# Patient Record
Sex: FEMALE | Race: OTHER | HISPANIC OR LATINO
[De-identification: names, ages, dates, MRNs, and addresses within clinical notes are randomized per-mention and may not be internally consistent; named-entity substitution may affect disease eponyms.]

---

## 2017-03-28 ENCOUNTER — APPOINTMENT (OUTPATIENT)
Dept: PLASTIC SURGERY | Facility: CLINIC | Age: 52
End: 2017-03-28

## 2017-03-28 VITALS
SYSTOLIC BLOOD PRESSURE: 151 MMHG | HEART RATE: 59 BPM | HEIGHT: 60 IN | WEIGHT: 165 LBS | BODY MASS INDEX: 32.39 KG/M2 | TEMPERATURE: 97.7 F | DIASTOLIC BLOOD PRESSURE: 93 MMHG

## 2017-03-28 DIAGNOSIS — Z78.9 OTHER SPECIFIED HEALTH STATUS: ICD-10-CM

## 2017-03-28 DIAGNOSIS — N62 HYPERTROPHY OF BREAST: ICD-10-CM

## 2017-03-28 DIAGNOSIS — I10 ESSENTIAL (PRIMARY) HYPERTENSION: ICD-10-CM

## 2017-03-28 DIAGNOSIS — Z82.49 FAMILY HISTORY OF ISCHEMIC HEART DISEASE AND OTHER DISEASES OF THE CIRCULATORY SYSTEM: ICD-10-CM

## 2017-03-30 PROBLEM — Z78.9 SOCIAL ALCOHOL USE: Status: ACTIVE | Noted: 2017-03-28

## 2017-03-30 PROBLEM — Z78.9 EXERCISES DAILY: Status: ACTIVE | Noted: 2017-03-28

## 2017-03-30 PROBLEM — Z78.9 DOES NOT USE ILLICIT DRUGS: Status: ACTIVE | Noted: 2017-03-28

## 2017-03-30 PROBLEM — Z82.49 FAMILY HISTORY OF HYPERTENSION: Status: ACTIVE | Noted: 2017-03-28

## 2017-03-30 RX ORDER — AMOXICILLIN AND CLAVULANATE POTASSIUM 500; 125 MG/1; MG/1
500-125 TABLET, FILM COATED ORAL
Qty: 14 | Refills: 0 | Status: COMPLETED | COMMUNITY
Start: 2016-12-06

## 2017-03-30 RX ORDER — METHOCARBAMOL 500 MG/1
500 TABLET, FILM COATED ORAL
Qty: 40 | Refills: 0 | Status: COMPLETED | COMMUNITY
Start: 2016-11-08

## 2017-03-30 RX ORDER — METRONIDAZOLE 7.5 MG/G
0.75 GEL VAGINAL
Qty: 70 | Refills: 0 | Status: COMPLETED | COMMUNITY
Start: 2016-10-05

## 2017-03-30 RX ORDER — TRIAMTERENE AND HYDROCHLOROTHIAZIDE 25; 37.5 MG/1; MG/1
37.5-25 TABLET ORAL
Qty: 90 | Refills: 0 | Status: ACTIVE | COMMUNITY
Start: 2017-02-28

## 2017-04-03 PROBLEM — N62 MACROMASTIA: Status: ACTIVE | Noted: 2017-04-03

## 2017-04-03 PROBLEM — I10 HYPERTENSION: Status: ACTIVE | Noted: 2017-03-28

## 2019-05-04 ENCOUNTER — EMERGENCY (EMERGENCY)
Facility: HOSPITAL | Age: 54
LOS: 1 days | Discharge: HOME | End: 2019-05-04
Admitting: EMERGENCY MEDICINE
Payer: COMMERCIAL

## 2019-05-04 VITALS
HEART RATE: 81 BPM | SYSTOLIC BLOOD PRESSURE: 122 MMHG | RESPIRATION RATE: 16 BRPM | OXYGEN SATURATION: 100 % | DIASTOLIC BLOOD PRESSURE: 70 MMHG | TEMPERATURE: 99 F

## 2019-05-04 VITALS
DIASTOLIC BLOOD PRESSURE: 97 MMHG | HEART RATE: 90 BPM | TEMPERATURE: 101 F | RESPIRATION RATE: 16 BRPM | OXYGEN SATURATION: 100 % | SYSTOLIC BLOOD PRESSURE: 160 MMHG

## 2019-05-04 LAB
ALBUMIN SERPL ELPH-MCNC: 4.6 G/DL — SIGNIFICANT CHANGE UP (ref 3.3–5)
ALP SERPL-CCNC: 57 U/L — SIGNIFICANT CHANGE UP (ref 40–120)
ALT FLD-CCNC: 20 U/L — SIGNIFICANT CHANGE UP (ref 4–33)
ANION GAP SERPL CALC-SCNC: 12 MMO/L — SIGNIFICANT CHANGE UP (ref 7–14)
APPEARANCE UR: CLEAR — SIGNIFICANT CHANGE UP
AST SERPL-CCNC: 16 U/L — SIGNIFICANT CHANGE UP (ref 4–32)
BASE EXCESS BLDV CALC-SCNC: 3.6 MMOL/L — SIGNIFICANT CHANGE UP
BASOPHILS # BLD AUTO: 0.03 K/UL — SIGNIFICANT CHANGE UP (ref 0–0.2)
BASOPHILS NFR BLD AUTO: 0.7 % — SIGNIFICANT CHANGE UP (ref 0–2)
BILIRUB SERPL-MCNC: 0.3 MG/DL — SIGNIFICANT CHANGE UP (ref 0.2–1.2)
BILIRUB UR-MCNC: NEGATIVE — SIGNIFICANT CHANGE UP
BLOOD GAS VENOUS - CREATININE: 0.64 MG/DL — SIGNIFICANT CHANGE UP (ref 0.5–1.3)
BLOOD UR QL VISUAL: NEGATIVE — SIGNIFICANT CHANGE UP
BUN SERPL-MCNC: 10 MG/DL — SIGNIFICANT CHANGE UP (ref 7–23)
CALCIUM SERPL-MCNC: 9.9 MG/DL — SIGNIFICANT CHANGE UP (ref 8.4–10.5)
CHLORIDE BLDV-SCNC: 103 MMOL/L — SIGNIFICANT CHANGE UP (ref 96–108)
CHLORIDE SERPL-SCNC: 99 MMOL/L — SIGNIFICANT CHANGE UP (ref 98–107)
CO2 SERPL-SCNC: 24 MMOL/L — SIGNIFICANT CHANGE UP (ref 22–31)
COLOR SPEC: YELLOW — SIGNIFICANT CHANGE UP
CREAT SERPL-MCNC: 0.61 MG/DL — SIGNIFICANT CHANGE UP (ref 0.5–1.3)
EOSINOPHIL # BLD AUTO: 0 K/UL — SIGNIFICANT CHANGE UP (ref 0–0.5)
EOSINOPHIL NFR BLD AUTO: 0 % — SIGNIFICANT CHANGE UP (ref 0–6)
GAS PNL BLDV: 133 MMOL/L — LOW (ref 136–146)
GLUCOSE BLDV-MCNC: 80 MG/DL — SIGNIFICANT CHANGE UP (ref 70–99)
GLUCOSE SERPL-MCNC: 87 MG/DL — SIGNIFICANT CHANGE UP (ref 70–99)
GLUCOSE UR-MCNC: NEGATIVE — SIGNIFICANT CHANGE UP
HCO3 BLDV-SCNC: 27 MMOL/L — SIGNIFICANT CHANGE UP (ref 20–27)
HCT VFR BLD CALC: 36.6 % — SIGNIFICANT CHANGE UP (ref 34.5–45)
HCT VFR BLDV CALC: 35.1 % — SIGNIFICANT CHANGE UP (ref 34.5–45)
HGB BLD-MCNC: 11.5 G/DL — SIGNIFICANT CHANGE UP (ref 11.5–15.5)
HGB BLDV-MCNC: 11.4 G/DL — LOW (ref 11.5–15.5)
IMM GRANULOCYTES NFR BLD AUTO: 0 % — SIGNIFICANT CHANGE UP (ref 0–1.5)
KETONES UR-MCNC: SIGNIFICANT CHANGE UP
LACTATE BLDV-MCNC: 1.3 MMOL/L — SIGNIFICANT CHANGE UP (ref 0.5–2)
LEUKOCYTE ESTERASE UR-ACNC: NEGATIVE — SIGNIFICANT CHANGE UP
LYMPHOCYTES # BLD AUTO: 1.32 K/UL — SIGNIFICANT CHANGE UP (ref 1–3.3)
LYMPHOCYTES # BLD AUTO: 30.5 % — SIGNIFICANT CHANGE UP (ref 13–44)
MCHC RBC-ENTMCNC: 25.3 PG — LOW (ref 27–34)
MCHC RBC-ENTMCNC: 31.4 % — LOW (ref 32–36)
MCV RBC AUTO: 80.6 FL — SIGNIFICANT CHANGE UP (ref 80–100)
MONOCYTES # BLD AUTO: 0.62 K/UL — SIGNIFICANT CHANGE UP (ref 0–0.9)
MONOCYTES NFR BLD AUTO: 14.3 % — HIGH (ref 2–14)
NEUTROPHILS # BLD AUTO: 2.36 K/UL — SIGNIFICANT CHANGE UP (ref 1.8–7.4)
NEUTROPHILS NFR BLD AUTO: 54.5 % — SIGNIFICANT CHANGE UP (ref 43–77)
NITRITE UR-MCNC: NEGATIVE — SIGNIFICANT CHANGE UP
NRBC # FLD: 0 K/UL — SIGNIFICANT CHANGE UP (ref 0–0)
PCO2 BLDV: 43 MMHG — SIGNIFICANT CHANGE UP (ref 41–51)
PH BLDV: 7.42 PH — SIGNIFICANT CHANGE UP (ref 7.32–7.43)
PH UR: 6.5 — SIGNIFICANT CHANGE UP (ref 5–8)
PLATELET # BLD AUTO: 259 K/UL — SIGNIFICANT CHANGE UP (ref 150–400)
PMV BLD: 10 FL — SIGNIFICANT CHANGE UP (ref 7–13)
PO2 BLDV: 31 MMHG — LOW (ref 35–40)
POTASSIUM BLDV-SCNC: 3.6 MMOL/L — SIGNIFICANT CHANGE UP (ref 3.4–4.5)
POTASSIUM SERPL-MCNC: 4 MMOL/L — SIGNIFICANT CHANGE UP (ref 3.5–5.3)
POTASSIUM SERPL-SCNC: 4 MMOL/L — SIGNIFICANT CHANGE UP (ref 3.5–5.3)
PROT SERPL-MCNC: 7.9 G/DL — SIGNIFICANT CHANGE UP (ref 6–8.3)
PROT UR-MCNC: 10 — SIGNIFICANT CHANGE UP
RBC # BLD: 4.54 M/UL — SIGNIFICANT CHANGE UP (ref 3.8–5.2)
RBC # FLD: 13.6 % — SIGNIFICANT CHANGE UP (ref 10.3–14.5)
SAO2 % BLDV: 53.1 % — LOW (ref 60–85)
SODIUM SERPL-SCNC: 135 MMOL/L — SIGNIFICANT CHANGE UP (ref 135–145)
SP GR SPEC: 1.03 — SIGNIFICANT CHANGE UP (ref 1–1.04)
UROBILINOGEN FLD QL: NORMAL — SIGNIFICANT CHANGE UP
WBC # BLD: 4.33 K/UL — SIGNIFICANT CHANGE UP (ref 3.8–10.5)
WBC # FLD AUTO: 4.33 K/UL — SIGNIFICANT CHANGE UP (ref 3.8–10.5)

## 2019-05-04 PROCEDURE — 99284 EMERGENCY DEPT VISIT MOD MDM: CPT

## 2019-05-04 PROCEDURE — 76830 TRANSVAGINAL US NON-OB: CPT | Mod: 26

## 2019-05-04 RX ORDER — KETOROLAC TROMETHAMINE 30 MG/ML
30 SYRINGE (ML) INJECTION ONCE
Qty: 0 | Refills: 0 | Status: DISCONTINUED | OUTPATIENT
Start: 2019-05-04 | End: 2019-05-04

## 2019-05-04 RX ORDER — METRONIDAZOLE 500 MG
500 TABLET ORAL ONCE
Qty: 0 | Refills: 0 | Status: COMPLETED | OUTPATIENT
Start: 2019-05-04 | End: 2019-05-04

## 2019-05-04 RX ORDER — METRONIDAZOLE 500 MG
1 TABLET ORAL
Qty: 42 | Refills: 0
Start: 2019-05-04 | End: 2019-05-17

## 2019-05-04 RX ORDER — SODIUM CHLORIDE 9 MG/ML
1000 INJECTION INTRAMUSCULAR; INTRAVENOUS; SUBCUTANEOUS ONCE
Qty: 0 | Refills: 0 | Status: COMPLETED | OUTPATIENT
Start: 2019-05-04 | End: 2019-05-04

## 2019-05-04 RX ADMIN — Medication 500 MILLIGRAM(S): at 23:06

## 2019-05-04 RX ADMIN — Medication 110 MILLIGRAM(S): at 23:06

## 2019-05-04 RX ADMIN — Medication 30 MILLIGRAM(S): at 18:58

## 2019-05-04 RX ADMIN — SODIUM CHLORIDE 1000 MILLILITER(S): 9 INJECTION INTRAMUSCULAR; INTRAVENOUS; SUBCUTANEOUS at 18:58

## 2019-05-04 NOTE — ED PROVIDER NOTE - OBJECTIVE STATEMENT
54 y/o female pmh htn c/o abd pain x1 week and fever x1 day. Pt admits to intermittent lower abd pain, worse with palpation, no relief with anything. Pt admits to developing whitish vaginal discharge x4 days ago as well as urinary frequency. Pt developed fever x1 day ago. Pt went to urgent care today and was found to be febrile and was sent to the ER. Pt also admits to generalized headache. Pt denies chest pain, sob, n/v/d, dysuria, vaginal bleeding, numbness, tingling, weakness, dizziness, syncope or chills. [place] recent [place] x[place] ago. Denies being sexually active.

## 2019-05-04 NOTE — ED ADULT NURSE NOTE - NSIMPLEMENTINTERV_GEN_ALL_ED
Implemented All Universal Safety Interventions:  Jo[place] to call system. Call bell, personal items and telephone within reach. Instruct patient to call for assistance. Room bathroom lighting operational. Non-slip footwear when patient is off stretcher. Physically safe environment: no spills, clutter or unnecessary equipment. Stretcher in lowest position, wheels locked, appropriate side rails in place.

## 2019-05-04 NOTE — ED ADULT NURSE NOTE - CHIEF COMPLAINT QUOTE
Pt sent from [facility] with lower abd pain, fever . white vag discharge and headache  x 1 week

## 2019-05-04 NOTE — ED ADULT TRIAGE NOTE - CHIEF COMPLAINT QUOTE
Pt sent from [facility] with lower abd pain, fever . white vag discharge and headache  x 1 week

## 2019-05-04 NOTE — ED ADULT NURSE NOTE - OBJECTIVE STATEMENT
pt alert,oriented x3. reports headache,vag discharge,abd pains,fever. pa to evaluate  at present. iv access,labs sent. will continue to monitor

## 2019-05-05 LAB — SPECIMEN SOURCE: SIGNIFICANT CHANGE UP

## 2019-05-06 LAB
BACTERIA UR CULT: SIGNIFICANT CHANGE UP
C TRACH RRNA SPEC QL NAA+PROBE: SIGNIFICANT CHANGE UP
N GONORRHOEA RRNA SPEC QL NAA+PROBE: SIGNIFICANT CHANGE UP
SPECIMEN SOURCE: SIGNIFICANT CHANGE UP

## 2019-09-09 ENCOUNTER — RESULT REVIEW (OUTPATIENT)
Age: 54
End: 2019-09-09

## 2020-01-13 ENCOUNTER — INPATIENT (INPATIENT)
Facility: HOSPITAL | Age: 55
LOS: 0 days | Discharge: HOME | End: 2020-01-14
Attending: INTERNAL MEDICINE | Admitting: INTERNAL MEDICINE
Payer: COMMERCIAL

## 2020-01-13 VITALS
HEART RATE: 68 BPM | SYSTOLIC BLOOD PRESSURE: 147 MMHG | TEMPERATURE: 98 F | OXYGEN SATURATION: 99 % | RESPIRATION RATE: 20 BRPM | DIASTOLIC BLOOD PRESSURE: 63 MMHG

## 2020-01-13 DIAGNOSIS — D72.819 DECREASED WHITE BLOOD CELL COUNT, UNSPECIFIED: ICD-10-CM

## 2020-01-13 DIAGNOSIS — Z98.891 HISTORY OF UTERINE SCAR FROM PREVIOUS SURGERY: Chronic | ICD-10-CM

## 2020-01-13 DIAGNOSIS — I25.10 ATHEROSCLEROTIC HEART DISEASE OF NATIVE CORONARY ARTERY WITHOUT ANGINA PECTORIS: ICD-10-CM

## 2020-01-13 DIAGNOSIS — I20.9 ANGINA PECTORIS, UNSPECIFIED: ICD-10-CM

## 2020-01-13 DIAGNOSIS — I10 ESSENTIAL (PRIMARY) HYPERTENSION: ICD-10-CM

## 2020-01-13 DIAGNOSIS — Z29.9 ENCOUNTER FOR PROPHYLACTIC MEASURES, UNSPECIFIED: ICD-10-CM

## 2020-01-13 DIAGNOSIS — R07.9 CHEST PAIN, UNSPECIFIED: ICD-10-CM

## 2020-01-13 DIAGNOSIS — E78.5 HYPERLIPIDEMIA, UNSPECIFIED: ICD-10-CM

## 2020-01-13 LAB
ALBUMIN SERPL ELPH-MCNC: 4.7 G/DL — SIGNIFICANT CHANGE UP (ref 3.3–5)
ALP SERPL-CCNC: 70 U/L — SIGNIFICANT CHANGE UP (ref 40–120)
ALT FLD-CCNC: 25 U/L — SIGNIFICANT CHANGE UP (ref 4–33)
ANION GAP SERPL CALC-SCNC: 11 MMO/L — SIGNIFICANT CHANGE UP (ref 7–14)
APTT BLD: 30.3 SEC — SIGNIFICANT CHANGE UP (ref 27.5–36.3)
AST SERPL-CCNC: 19 U/L — SIGNIFICANT CHANGE UP (ref 4–32)
BASE EXCESS BLDV CALC-SCNC: 4.7 MMOL/L — SIGNIFICANT CHANGE UP
BASOPHILS # BLD AUTO: 0.01 K/UL — SIGNIFICANT CHANGE UP (ref 0–0.2)
BASOPHILS NFR BLD AUTO: 0.3 % — SIGNIFICANT CHANGE UP (ref 0–2)
BILIRUB SERPL-MCNC: < 0.2 MG/DL — LOW (ref 0.2–1.2)
BLOOD GAS VENOUS - CREATININE: 0.6 MG/DL — SIGNIFICANT CHANGE UP (ref 0.5–1.3)
BUN SERPL-MCNC: 9 MG/DL — SIGNIFICANT CHANGE UP (ref 7–23)
CALCIUM SERPL-MCNC: 10.1 MG/DL — SIGNIFICANT CHANGE UP (ref 8.4–10.5)
CHLORIDE BLDV-SCNC: 106 MMOL/L — SIGNIFICANT CHANGE UP (ref 96–108)
CHLORIDE SERPL-SCNC: 100 MMOL/L — SIGNIFICANT CHANGE UP (ref 98–107)
CK MB BLD-MCNC: 1.27 NG/ML — SIGNIFICANT CHANGE UP (ref 1–4.7)
CK MB BLD-MCNC: SIGNIFICANT CHANGE UP (ref 0–2.5)
CK SERPL-CCNC: 66 U/L — SIGNIFICANT CHANGE UP (ref 25–170)
CO2 SERPL-SCNC: 27 MMOL/L — SIGNIFICANT CHANGE UP (ref 22–31)
CREAT SERPL-MCNC: 0.59 MG/DL — SIGNIFICANT CHANGE UP (ref 0.5–1.3)
EOSINOPHIL # BLD AUTO: 0.1 K/UL — SIGNIFICANT CHANGE UP (ref 0–0.5)
EOSINOPHIL NFR BLD AUTO: 2.7 % — SIGNIFICANT CHANGE UP (ref 0–6)
GAS PNL BLDV: 136 MMOL/L — SIGNIFICANT CHANGE UP (ref 136–146)
GLUCOSE BLDV-MCNC: 97 MG/DL — SIGNIFICANT CHANGE UP (ref 70–99)
GLUCOSE SERPL-MCNC: 100 MG/DL — HIGH (ref 70–99)
HCO3 BLDV-SCNC: 26 MMOL/L — SIGNIFICANT CHANGE UP (ref 20–27)
HCT VFR BLD CALC: 37.8 % — SIGNIFICANT CHANGE UP (ref 34.5–45)
HCT VFR BLDV CALC: 36.4 % — SIGNIFICANT CHANGE UP (ref 34.5–45)
HGB BLD-MCNC: 11.5 G/DL — SIGNIFICANT CHANGE UP (ref 11.5–15.5)
HGB BLDV-MCNC: 11.8 G/DL — SIGNIFICANT CHANGE UP (ref 11.5–15.5)
IMM GRANULOCYTES NFR BLD AUTO: 0.3 % — SIGNIFICANT CHANGE UP (ref 0–1.5)
INR BLD: 1.04 — SIGNIFICANT CHANGE UP (ref 0.88–1.17)
LACTATE BLDV-MCNC: 0.9 MMOL/L — SIGNIFICANT CHANGE UP (ref 0.5–2)
LYMPHOCYTES # BLD AUTO: 1.42 K/UL — SIGNIFICANT CHANGE UP (ref 1–3.3)
LYMPHOCYTES # BLD AUTO: 38.8 % — SIGNIFICANT CHANGE UP (ref 13–44)
MCHC RBC-ENTMCNC: 25.3 PG — LOW (ref 27–34)
MCHC RBC-ENTMCNC: 30.4 % — LOW (ref 32–36)
MCV RBC AUTO: 83.3 FL — SIGNIFICANT CHANGE UP (ref 80–100)
MONOCYTES # BLD AUTO: 0.41 K/UL — SIGNIFICANT CHANGE UP (ref 0–0.9)
MONOCYTES NFR BLD AUTO: 11.2 % — SIGNIFICANT CHANGE UP (ref 2–14)
NEUTROPHILS # BLD AUTO: 1.71 K/UL — LOW (ref 1.8–7.4)
NEUTROPHILS NFR BLD AUTO: 46.7 % — SIGNIFICANT CHANGE UP (ref 43–77)
NRBC # FLD: 0 K/UL — SIGNIFICANT CHANGE UP (ref 0–0)
NT-PROBNP SERPL-SCNC: 122 PG/ML — SIGNIFICANT CHANGE UP
PCO2 BLDV: 57 MMHG — HIGH (ref 41–51)
PH BLDV: 7.34 PH — SIGNIFICANT CHANGE UP (ref 7.32–7.43)
PLATELET # BLD AUTO: 258 K/UL — SIGNIFICANT CHANGE UP (ref 150–400)
PMV BLD: 10 FL — SIGNIFICANT CHANGE UP (ref 7–13)
PO2 BLDV: < 24 MMHG — LOW (ref 35–40)
POTASSIUM BLDV-SCNC: 3.6 MMOL/L — SIGNIFICANT CHANGE UP (ref 3.4–4.5)
POTASSIUM SERPL-MCNC: 3.6 MMOL/L — SIGNIFICANT CHANGE UP (ref 3.5–5.3)
POTASSIUM SERPL-SCNC: 3.6 MMOL/L — SIGNIFICANT CHANGE UP (ref 3.5–5.3)
PROT SERPL-MCNC: 8.5 G/DL — HIGH (ref 6–8.3)
PROTHROM AB SERPL-ACNC: 11.6 SEC — SIGNIFICANT CHANGE UP (ref 9.8–13.1)
RBC # BLD: 4.54 M/UL — SIGNIFICANT CHANGE UP (ref 3.8–5.2)
RBC # FLD: 13.7 % — SIGNIFICANT CHANGE UP (ref 10.3–14.5)
SAO2 % BLDV: 29.4 % — LOW (ref 60–85)
SODIUM SERPL-SCNC: 138 MMOL/L — SIGNIFICANT CHANGE UP (ref 135–145)
TROPONIN T, HIGH SENSITIVITY: 253 NG/L — CRITICAL HIGH (ref ?–14)
TROPONIN T, HIGH SENSITIVITY: 258 NG/L — CRITICAL HIGH (ref ?–14)
WBC # BLD: 3.66 K/UL — LOW (ref 3.8–10.5)
WBC # FLD AUTO: 3.66 K/UL — LOW (ref 3.8–10.5)

## 2020-01-13 PROCEDURE — 71046 X-RAY EXAM CHEST 2 VIEWS: CPT | Mod: 26

## 2020-01-13 PROCEDURE — 99223 1ST HOSP IP/OBS HIGH 75: CPT

## 2020-01-13 RX ORDER — ENOXAPARIN SODIUM 100 MG/ML
40 INJECTION SUBCUTANEOUS DAILY
Refills: 0 | Status: DISCONTINUED | OUTPATIENT
Start: 2020-01-13 | End: 2020-01-14

## 2020-01-13 RX ORDER — METOPROLOL TARTRATE 50 MG
25 TABLET ORAL
Refills: 0 | Status: DISCONTINUED | OUTPATIENT
Start: 2020-01-13 | End: 2020-01-14

## 2020-01-13 RX ORDER — ASPIRIN/CALCIUM CARB/MAGNESIUM 324 MG
81 TABLET ORAL DAILY
Refills: 0 | Status: DISCONTINUED | OUTPATIENT
Start: 2020-01-13 | End: 2020-01-14

## 2020-01-13 RX ORDER — ATORVASTATIN CALCIUM 80 MG/1
80 TABLET, FILM COATED ORAL AT BEDTIME
Refills: 0 | Status: DISCONTINUED | OUTPATIENT
Start: 2020-01-13 | End: 2020-01-14

## 2020-01-13 RX ORDER — TICAGRELOR 90 MG/1
90 TABLET ORAL EVERY 12 HOURS
Refills: 0 | Status: DISCONTINUED | OUTPATIENT
Start: 2020-01-13 | End: 2020-01-14

## 2020-01-13 RX ORDER — ASPIRIN/CALCIUM CARB/MAGNESIUM 324 MG
324 TABLET ORAL ONCE
Refills: 0 | Status: COMPLETED | OUTPATIENT
Start: 2020-01-13 | End: 2020-01-13

## 2020-01-13 RX ORDER — TRIAMTERENE/HYDROCHLOROTHIAZID 75 MG-50MG
1 TABLET ORAL DAILY
Refills: 0 | Status: DISCONTINUED | OUTPATIENT
Start: 2020-01-13 | End: 2020-01-14

## 2020-01-13 RX ADMIN — ATORVASTATIN CALCIUM 80 MILLIGRAM(S): 80 TABLET, FILM COATED ORAL at 22:48

## 2020-01-13 RX ADMIN — Medication 25 MILLIGRAM(S): at 22:49

## 2020-01-13 RX ADMIN — TICAGRELOR 90 MILLIGRAM(S): 90 TABLET ORAL at 22:49

## 2020-01-13 RX ADMIN — Medication 324 MILLIGRAM(S): at 17:40

## 2020-01-13 NOTE — H&P ADULT - NSHPREVIEWOFSYSTEMS_GEN_ALL_CORE
CONSTITUTIONAL:  No weight loss, fever, chills, weakness or fatigue.  HEENT:  Eyes:  No visual loss, blurred vision, double vision or yellow sclerae. Ears, Nose, Throat:  No hearing loss, sneezing, congestion, runny nose or sore throat.  SKIN:  No rash or itching.  CARDIOVASCULAR: +chest pain and pressure. No palpitations or edema.  RESPIRATORY:  +SOB w/ eertion No cough or sputum.  GASTROINTESTINAL:  No anorexia, nausea, vomiting or diarrhea. No abdominal pain or blood.  GENITOURINARY:  Denies hematuria, dysuria.   NEUROLOGICAL:  No headache, dizziness, syncope, paralysis, ataxia, numbness or tingling in the extremities. No change in bowel or bladder control.  MUSCULOSKELETAL:  No muscle, back pain, joint pain or stiffness.  HEMATOLOGIC:  No anemia, bleeding or bruising.  LYMPHATICS:  No enlarged nodes. No history of splenectomy.  PSYCHIATRIC:  No history of depression or anxiety.  ENDOCRINOLOGIC:  No reports of sweating, cold or heat intolerance. No polyuria or polydipsia.  ALLERGIES:  No history of asthma, hives, eczema or rhinitis. CONSTITUTIONAL:  No weight loss, fever, chills, weakness or fatigue.  HEENT:  Eyes:  No visual loss, blurred vision, double vision or yellow sclerae. Ears, Nose, Throat:  No hearing loss, sneezing, congestion, runny nose or sore throat.  SKIN:  No rash or itching.  CARDIOVASCULAR: +chest pain and pressure. No palpitations or edema.  RESPIRATORY:  +SOB w/ exertion No cough or sputum.  GASTROINTESTINAL:  No anorexia, nausea, vomiting or diarrhea. No abdominal pain or blood.  GENITOURINARY:  Denies hematuria, dysuria.   NEUROLOGICAL:  No headache, dizziness, syncope, paralysis, ataxia, numbness or tingling in the extremities. No change in bowel or bladder control.  MUSCULOSKELETAL:  No muscle, back pain, joint pain or stiffness.  HEMATOLOGIC:  No anemia, bleeding or bruising.  LYMPHATICS:  No enlarged nodes. No history of splenectomy.  PSYCHIATRIC:  No history of depression or anxiety.  ENDOCRINOLOGIC:  No reports of sweating, cold or heat intolerance. No polyuria or polydipsia.  ALLERGIES:  No history of asthma, hives, eczema or rhinitis.

## 2020-01-13 NOTE — ED ADULT NURSE NOTE - OBJECTIVE STATEMENT
54 female, pmh MI with 2 stents (dec 2019), HTN, c/o ruben chest pain that pt describes as burning since 11pm last night. pt also reports SOB with exertion. pt reports being scheduled for 3rd cardiac stent next week. pt denies cough, fever, n/v/d, abdominal pain, urinary symptoms, numbness/ tingling to extremities. 18g iv insert to left ac, labs sent as ordered. ekg performed, ccm inplace

## 2020-01-13 NOTE — ED ADULT NURSE REASSESSMENT NOTE - NS ED NURSE REASSESS COMMENT FT1
Report received from NANCY Marcum. Pt. received A&Ox4, with 18G IV in left ac, on cardiac monitor. No acute distress at present. Respirations even & unlabored. MD at bedside for evaluation. Will continue to monitor.

## 2020-01-13 NOTE — ED ADULT TRIAGE NOTE - CHIEF COMPLAINT QUOTE
Patient has c/o chest pain, shortness of breath, dizziness and lightheadedness that started last night. Pt has 3 stents and last one was placed Wednesday.

## 2020-01-13 NOTE — H&P ADULT - NSHPPHYSICALEXAM_GEN_ALL_CORE
GENERAL APPEARANCE: Well developed, well nourished, alert and cooperative. NAD.   HEENT:  PERRL, EOMI. External auditory canals normal, hearing grossly intact.  NECK: Neck supple, non-tender without lymphadenopathy, masses or thyromegaly.  CARDIAC: Normal S1 and S2. No S3, S4 or murmurs. Rhythm is regular.  LUNGS: Clear to auscultation and percussion without rales, rhonchi, wheezing or diminished breath sounds.  ABDOMEN: Positive bowel sounds. Soft, nondistended, nontender. No guarding or rebound.   MUSCULOSKELETAL: ROM intact spine and extremities. No joint erythema or tenderness. No reproducible chest pain   BACK: normal posture, no spinal deformity, symmetry of spinal muscles, without tenderness, decreased range of motion.  EXTREMITIES: No significant deformity or joint abnormality. No edema. Peripheral pulses intact. No varicosities.  NEUROLOGICAL: CN II-XII intact. Strength and sensation symmetric and intact throughout.   SKIN: Skin normal color, texture and turgor with no lesions or eruptions.  PSYCHIATRIC: AOx3.Normal affect and behavior.

## 2020-01-13 NOTE — ED PROVIDER NOTE - PROGRESS NOTE DETAILS
KIMBERLY Pgy3: patient evaluated by cards fellow. Delta trop/ck/ckmb unremarkable. more likely a/w pericarditis. will a/m to tele for echo

## 2020-01-13 NOTE — H&P ADULT - HISTORY OF PRESENT ILLNESS
54 F with h/o MI s/p DANNIE to 90% RCA and 90% OM lesions 12/12/2019 with staged PCI to mid-LAD 85% on 1/8/19, HTN, HLD who presents with chest pain. Pt reports she had severe chest pressure in 12/2019 which prompted her to go to [facility] where she had an MI and was stented. TTE reportedly with normal LV function at the time. Since then she was chest pain free until her staged procedure where she had to stay in the hospital due to chest pain. She left the next day and has been feeling fine until yesterday when she started to feel substernal chest pressure that is mild in nature but constant. States it felt similar to chest pain she had when she initially presented for ACS but states it is less severe. Describes it as pressure like and burning pain that does not radiate. Present at rest and on exertion. Pain also made worse when taking deep breaths. Associated shortness of breath with exertion that has been ongoing since stenting. No f/c, cough, abd pain, n/v/d, or other symptoms. Endorses medication adherence to DAPT

## 2020-01-13 NOTE — H&P ADULT - ASSESSMENT
54 F with h/o MI s/p DANNIE to 90% RCA and 90% OM lesions 12/12/2019 with staged PCI to mid-LAD 85% on 1/8/19, HTN, HLD who presents with chest pain in the setting of elevated trops, nonischemic EKG

## 2020-01-13 NOTE — CONSULT NOTE ADULT - ASSESSMENT
Assessment and Recommendations:  54 F with h/o MI s/p DANNIE to 90% RCA and 90% OM lesions 12/12/2019 with staged PCI to mid-LAD 85% on 1/8/19 who presents with constant chest pain x 1 day, found to have mildly elevated troponins to 200s but CKMB is negative.    #elevated troponin  #chest pain  -elevated troponin could possibly be related to stage PCI procedure performed on mid-LAD on 1/8/19  -CKMB being negative makes plaque rupture/in-stent thrombosis much less likely, plus pt has been taking DAPT consistently and ECG not c/w new ischemia  -check TTE to r/o pericardial effusion/inflammation as cause for potential pain  -c/w dapt and crestor daily  -c/w home metoprolol 25 q12h  -c/w home triamterene-hctz  -please gather collateral from patient's cardiologist to see her exact coronary anatomy    Discussed with Dr. Erazo interventional attending    Abisai Almaraz MD  Cardiology Fellow    All Cardiology service information can be found 24/7 on [email or web address], password: [email or web address]

## 2020-01-13 NOTE — ED PROVIDER NOTE - ATTENDING CONTRIBUTION TO CARE
54F hx MI s/p 3 stents (2 in 12/19 and 1 last week at [facility]) on aspirin/brillinta, htn, hld p/w cp. Patient reports gradual onset substernal cp that began last night, 5/10, worse with inspiration, feels similar to when she had her prior MI but less severe. Also reports increased sob and lightheadedness when walking. symptoms worsen with walking but improve when lying flat. Denies leg swelling. On exam: VSS lungs, heart, pulses, abdomen, neuro, extremities, skin, all normal on exam. EKG Normal. IMP: Atypical CP in 54F with known CAD with recent stent palcements, RO ACS, stent problem, other causes of CP. Plan: EKG monitor labs troponin CXR, will d/w cardiology

## 2020-01-13 NOTE — H&P ADULT - PROBLEM SELECTOR PLAN 1
-Pt reports substernal pressure/burning chest pain both at rest and with exertion. Possible pleuritic component  -Trops elevated at 250s however CKMB normal. EKG NSR, no acute ischemic changes. TTE with normal LV function, no wall motion abnormalities or pericardial effusions   -Appreciate cardiology recs.  More likely pain related to recent stenting vs less likely pericarditis. Less likely plaque rupture/in-stent thrombosis given normal EKG and pt reports compliance with DAPT. No evidence of pericardial effusion or pericarditis   -Continue to trend trops  -Serial EKG  -stat ekg and trops if chest pain worsens  -please gather collateral from patient's cardiologist to see her exact coronary anatomy, Dr. Candice Oh  -monitor on telemetry

## 2020-01-13 NOTE — CONSULT NOTE ADULT - SUBJECTIVE AND OBJECTIVE BOX
Patient seen and evaluated at bedside    Chief Complaint:    HPI: 54 F with h/o MI s/p DANNIE to 90% RCA and 90% OM lesions 2019 with staged PCI to mid-LAD 85% on 19 who presents with chest pain.    Pt reports she had severe chest pressure in 2019 which prompted her to go to [facility] where she had an MI and was stented. TTE reportedly with normal LV function at the time. Since then she was chest pain free until her staged procedure where she had to stay in the hospital due to chest pain. She left the next day and has been feeling fine until yesterday when she started to feel substernal chest pressure that is mild in nature but constant. This is worsened by exertion but feels better when she lays down. No f/c, cough, abd pain, n/v/d, or other symptoms. Endorses medication adherence to DAPT      PMHx:   MI (myocardial infarction)  HTN (hypertension)      PSHx:       Allergies:  No Known Allergies      Home Meds:  ASA  brillinta, metop 25 q12h  triamterene-hctz   rosuvastatin    Current Medications:       FAMILY HISTORY:  Dad  of MI at age 62    Social History:  no toxic habits. social alcohol    REVIEW OF SYSTEMS:  CONSTITUTIONAL: No weakness, fevers or chills  EYES/ENT: No visual changes;  No dysphagia  NECK: No pain or stiffness  RESPIRATORY: No cough, wheezing, hemoptysis; No shortness of breath  CARDIOVASCULAR: No chest pain or palpitations; No lower extremity edema  GASTROINTESTINAL: No abdominal or epigastric pain. No nausea, vomiting, or hematemesis; No diarrhea or constipation. No melena or hematochezia.  BACK: No back pain  GENITOURINARY: No dysuria, frequency or hematuria  NEUROLOGICAL: No numbness or weakness  SKIN: No itching, burning, rashes, or lesions   All other review of systems is negative unless indicated above.    Physical Exam:  T(F): 98 (), Max: 98 ()  HR: 68 () (68 - 68)  BP: 147/63 () (147/63 - 147/63)  RR: 20 ()  SpO2: 99% ()  GENERAL: No acute distress, well-developed  HEAD:  Atraumatic, Normocephalic  ENT: EOMI, PERRLA, conjunctiva and sclera clear, Neck supple, No JVD, moist mucosa  CHEST/LUNG: Clear to auscultation bilaterally; No wheeze, equal breath sounds bilaterally   BACK: No spinal tenderness  HEART: Regular rate and rhythm; No murmurs, rubs, or gallops  ABDOMEN: Soft, Nontender, Nondistended; Bowel sounds present  EXTREMITIES:  No clubbing, cyanosis, or edema  PSYCH: Nl behavior, nl affect  NEUROLOGY: AAOx3, non-focal, cranial nerves intact  SKIN: Normal color, No rashes or lesions    LINES:    Labs:  reviewed                        11.5   3.66  )-----------( 258      ( 2020 16:00 )             37.8         138  |  100  |  9   ----------------------------<  100<H>  3.6   |  27  |  0.59    Ca    10.1      2020 16:00    TPro  8.5<H>  /  Alb  4.7  /  TBili  < 0.2<L>  /  DBili  x   /  AST  19  /  ALT  25  /  AlkPhos  70  -    PT/INR - ( 2020 16:00 )   PT: 11.6 SEC;   INR: 1.04          PTT - ( 2020 16:00 )  PTT:30.3 SEC    CARDIAC MARKERS ( 2020 18:00 )  x     / x     / x     / 66 u/L / 1.27 ng/mL / x            Serum Pro-Brain Natriuretic Peptide: 122.0 pg/mL ( @ 16:00)    Cardiovascular Diagnostic Testing:    ECG: NSR 57, isolated TWI in lead III    Echo: pending

## 2020-01-13 NOTE — ED PROVIDER NOTE - OBJECTIVE STATEMENT
54F hx MI s/p 3 stents (2 in 12/19 and 1 last week) on aspirin/brillinta, htn, hld p/w cp. Patient reports gradual onset substernal cp that began last night, 5/10, worse with inspiration, feels similar to when she had her prior MI but less severe. Also reports increased sob and lightheadedness when walking. symptoms worsen with walking but improve when lying flat. Denies leg swelling 54F hx MI s/p 3 stents (2 in 12/19 and 1 last week at [facility]) on aspirin/brillinta, htn, hld p/w cp. Patient reports gradual onset substernal cp that began last night, 5/10, worse with inspiration, feels similar to when she had her prior MI but less severe. Also reports increased sob and lightheadedness when walking. symptoms worsen with walking but improve when lying flat. Denies leg swelling

## 2020-01-13 NOTE — H&P ADULT - NSHPLABSRESULTS_GEN_ALL_CORE
(01-13 @ 16:00)                      11.5  3.66 )-----------( 258                 37.8    Neutrophils = 1.71 (46.7%)  Lymphocytes = 1.42 (38.8%)  Eosinophils = 0.10 (2.7%)  Basophils = 0.01 (0.3%)  Monocytes = 0.41 (11.2%)  Bands = --%    01-13    138  |  100  |  9   ----------------------------<  100<H>  3.6   |  27  |  0.59    Ca    10.1      13 Jan 2020 16:00    TPro  8.5<H>  /  Alb  4.7  /  TBili  < 0.2<L>  /  DBili  x   /  AST  19  /  ALT  25  /  AlkPhos  70  01-13    ( 13 Jan 2020 16:00 )   PT: 11.6 SEC;   INR: 1.04 ;       PTT:30.3 SEC  CARDIAC MARKERS ( 13 Jan 2020 18:00 )  Trop x     / CK 66 u/L / CKMB 1.27 ng/mL   Trops: 253 --> 258      Venous Blood Gas:  01-13 @ 16:00  7.34/57/< 24/26/29.4  VBG Lactate: 0.9    Labs reviewed  EKG: NSR, no acute ischemic changes  Imaging reviewed    < from: Xray Chest 2 Views PA/Lat (01.13.20 @ 17:51) >    INTERPRETATION:  clear lungs    < end of copied text >

## 2020-01-13 NOTE — ED PROVIDER NOTE - CLINICAL SUMMARY MEDICAL DECISION MAKING FREE TEXT BOX
54F hx MI s/p 3 stents most recently 1 week ago p/w cp. possibly c/w post MI pericarditis vs acs. ekg WNL but high risk and relatively concerning story. Will get labs, serial ekgs, cxr, a/m.

## 2020-01-14 ENCOUNTER — TRANSCRIPTION ENCOUNTER (OUTPATIENT)
Age: 55
End: 2020-01-14

## 2020-01-14 VITALS
OXYGEN SATURATION: 100 % | HEART RATE: 86 BPM | RESPIRATION RATE: 17 BRPM | TEMPERATURE: 98 F | SYSTOLIC BLOOD PRESSURE: 101 MMHG | DIASTOLIC BLOOD PRESSURE: 71 MMHG

## 2020-01-14 LAB
ANION GAP SERPL CALC-SCNC: 13 MMO/L — SIGNIFICANT CHANGE UP (ref 7–14)
BASOPHILS # BLD AUTO: 0.02 K/UL — SIGNIFICANT CHANGE UP (ref 0–0.2)
BASOPHILS NFR BLD AUTO: 0.7 % — SIGNIFICANT CHANGE UP (ref 0–2)
BUN SERPL-MCNC: 10 MG/DL — SIGNIFICANT CHANGE UP (ref 7–23)
CALCIUM SERPL-MCNC: 9.9 MG/DL — SIGNIFICANT CHANGE UP (ref 8.4–10.5)
CHLORIDE SERPL-SCNC: 103 MMOL/L — SIGNIFICANT CHANGE UP (ref 98–107)
CHOLEST SERPL-MCNC: 114 MG/DL — LOW (ref 120–199)
CK MB BLD-MCNC: 1.1 NG/ML — SIGNIFICANT CHANGE UP (ref 1–4.7)
CK SERPL-CCNC: 60 U/L — SIGNIFICANT CHANGE UP (ref 25–170)
CO2 SERPL-SCNC: 25 MMOL/L — SIGNIFICANT CHANGE UP (ref 22–31)
CREAT SERPL-MCNC: 0.58 MG/DL — SIGNIFICANT CHANGE UP (ref 0.5–1.3)
EOSINOPHIL # BLD AUTO: 0.11 K/UL — SIGNIFICANT CHANGE UP (ref 0–0.5)
EOSINOPHIL NFR BLD AUTO: 3.6 % — SIGNIFICANT CHANGE UP (ref 0–6)
GLUCOSE SERPL-MCNC: 100 MG/DL — HIGH (ref 70–99)
HCT VFR BLD CALC: 34.8 % — SIGNIFICANT CHANGE UP (ref 34.5–45)
HCV AB S/CO SERPL IA: 0.08 S/CO — SIGNIFICANT CHANGE UP (ref 0–0.99)
HCV AB SERPL-IMP: SIGNIFICANT CHANGE UP
HDLC SERPL-MCNC: 60 MG/DL — SIGNIFICANT CHANGE UP (ref 45–65)
HGB BLD-MCNC: 11 G/DL — LOW (ref 11.5–15.5)
IMM GRANULOCYTES NFR BLD AUTO: 0.3 % — SIGNIFICANT CHANGE UP (ref 0–1.5)
LIPID PNL WITH DIRECT LDL SERPL: 42 MG/DL — SIGNIFICANT CHANGE UP
LYMPHOCYTES # BLD AUTO: 1.35 K/UL — SIGNIFICANT CHANGE UP (ref 1–3.3)
LYMPHOCYTES # BLD AUTO: 44.4 % — HIGH (ref 13–44)
MAGNESIUM SERPL-MCNC: 2 MG/DL — SIGNIFICANT CHANGE UP (ref 1.6–2.6)
MCHC RBC-ENTMCNC: 26.4 PG — LOW (ref 27–34)
MCHC RBC-ENTMCNC: 31.6 % — LOW (ref 32–36)
MCV RBC AUTO: 83.5 FL — SIGNIFICANT CHANGE UP (ref 80–100)
MONOCYTES # BLD AUTO: 0.32 K/UL — SIGNIFICANT CHANGE UP (ref 0–0.9)
MONOCYTES NFR BLD AUTO: 10.5 % — SIGNIFICANT CHANGE UP (ref 2–14)
NEUTROPHILS # BLD AUTO: 1.23 K/UL — LOW (ref 1.8–7.4)
NEUTROPHILS NFR BLD AUTO: 40.5 % — LOW (ref 43–77)
NRBC # FLD: 0 K/UL — SIGNIFICANT CHANGE UP (ref 0–0)
PHOSPHATE SERPL-MCNC: 4.4 MG/DL — SIGNIFICANT CHANGE UP (ref 2.5–4.5)
PLATELET # BLD AUTO: 238 K/UL — SIGNIFICANT CHANGE UP (ref 150–400)
PMV BLD: 10.1 FL — SIGNIFICANT CHANGE UP (ref 7–13)
POTASSIUM SERPL-MCNC: 4.4 MMOL/L — SIGNIFICANT CHANGE UP (ref 3.5–5.3)
POTASSIUM SERPL-SCNC: 4.4 MMOL/L — SIGNIFICANT CHANGE UP (ref 3.5–5.3)
RBC # BLD: 4.17 M/UL — SIGNIFICANT CHANGE UP (ref 3.8–5.2)
RBC # FLD: 13.7 % — SIGNIFICANT CHANGE UP (ref 10.3–14.5)
SODIUM SERPL-SCNC: 141 MMOL/L — SIGNIFICANT CHANGE UP (ref 135–145)
TRIGL SERPL-MCNC: 53 MG/DL — SIGNIFICANT CHANGE UP (ref 10–149)
TROPONIN T, HIGH SENSITIVITY: 192 NG/L — CRITICAL HIGH (ref ?–14)
TSH SERPL-MCNC: 1.8 UIU/ML — SIGNIFICANT CHANGE UP (ref 0.27–4.2)
WBC # BLD: 3.04 K/UL — LOW (ref 3.8–10.5)
WBC # FLD AUTO: 3.04 K/UL — LOW (ref 3.8–10.5)

## 2020-01-14 PROCEDURE — 99236 HOSP IP/OBS SAME DATE HI 85: CPT

## 2020-01-14 RX ORDER — ACETAMINOPHEN 500 MG
650 TABLET ORAL EVERY 6 HOURS
Refills: 0 | Status: DISCONTINUED | OUTPATIENT
Start: 2020-01-14 | End: 2020-01-14

## 2020-01-14 RX ORDER — ISOSORBIDE MONONITRATE 60 MG/1
1 TABLET, EXTENDED RELEASE ORAL
Qty: 30 | Refills: 0
Start: 2020-01-14 | End: 2020-02-12

## 2020-01-14 RX ORDER — COLCHICINE 0.6 MG
0.6 TABLET ORAL DAILY
Refills: 0 | Status: DISCONTINUED | OUTPATIENT
Start: 2020-01-14 | End: 2020-01-14

## 2020-01-14 RX ORDER — COLCHICINE 0.6 MG
1 TABLET ORAL
Qty: 30 | Refills: 0
Start: 2020-01-14 | End: 2020-02-12

## 2020-01-14 RX ORDER — ISOSORBIDE MONONITRATE 60 MG/1
30 TABLET, EXTENDED RELEASE ORAL DAILY
Refills: 0 | Status: DISCONTINUED | OUTPATIENT
Start: 2020-01-14 | End: 2020-01-14

## 2020-01-14 RX ADMIN — ENOXAPARIN SODIUM 40 MILLIGRAM(S): 100 INJECTION SUBCUTANEOUS at 12:25

## 2020-01-14 RX ADMIN — ISOSORBIDE MONONITRATE 30 MILLIGRAM(S): 60 TABLET, EXTENDED RELEASE ORAL at 12:27

## 2020-01-14 RX ADMIN — TICAGRELOR 90 MILLIGRAM(S): 90 TABLET ORAL at 07:30

## 2020-01-14 RX ADMIN — Medication 1 TABLET(S): at 10:01

## 2020-01-14 RX ADMIN — Medication 81 MILLIGRAM(S): at 12:24

## 2020-01-14 RX ADMIN — Medication 0.6 MILLIGRAM(S): at 12:25

## 2020-01-14 NOTE — DISCHARGE NOTE NURSING/CASE MANAGEMENT/SOCIAL WORK - PATIENT PORTAL LINK FT
You can access the FollowMyHealth Patient Portal offered by [facility] by registering at the following website: [email or web address]. By joining [facility]’s FollowMyHealth portal, you will also be able to view your health information using other applications (apps) compatible with our system.

## 2020-01-14 NOTE — DISCHARGE NOTE PROVIDER - HOSPITAL COURSE
54 F with h/o MI s/p DANNIE to 90% RCA and 90% OM lesions 12/12/2019 with staged PCI to mid-LAD 85% on 1/8/19, HTN, HLD who presents with chest pain. Pt reports she had severe chest pressure in 12/2019 which prompted her to go to [facility] where she had an MI and was stented. TTE reportedly with normal LV function at the time. Since then she was chest pain free until her staged procedure where she had to stay in the hospital due to chest pain. She left the next day and has been feeling fine until yesterday when she started to feel substernal chest pressure that is mild in nature but constant. States it felt similar to chest pain she had when she initially presented for ACS but states it is less severe. Describes it as pressure like and burning pain that does not radiate. Present at rest and on exertion. Pain also made worse when taking deep breaths. Associated shortness of breath with exertion that has been ongoing since stenting. No f/c, cough, abd pain, n/v/d, or other symptoms. Endorses medication adherence to DAPT. ECHO -CONCLUSIONS:    1. Normal left ventricular internal dimensions and wall    thicknesses.    2. Normal left ventricular systolic function. No segmental    wall motion abnormalities.    3. Normal left ventricular diastolic function.    4. Normal right ventricular size and function.    5. Estimated right ventricular systolic pressure equals 15    mm Hg, assuming right atrial pressure equals 10 mm Hg,    consistent with normal pulmonary pressures.        She has CAD s/p recent MI with PCIs    Either persistent symptoms from microvascular disease and/or inflammatory condition such as pericarditis    Will add Imdur and colchicine    No significant acute findings noted on echocardiogram     F/U with PMD and cardiologist as outpatient stable for discharge to home as per Dr Hugo

## 2020-01-14 NOTE — ED ADULT NURSE REASSESSMENT NOTE - NS ED NURSE REASSESS COMMENT FT1
Report received from break coverage RN Leola RIZO Pt. A&Ox4, resting comfortably on cardiac monitor. VS as noted. Respirations even & unlabored. Denies chest pain at present. Offers no complaints at present. Admitted to Tele, awaiting bed assignment.

## 2020-01-14 NOTE — DISCHARGE NOTE PROVIDER - NSDCCPCAREPLAN_GEN_ALL_CORE_FT
PRINCIPAL DISCHARGE DIAGNOSIS  Diagnosis: Angina pectoris  Assessment and Plan of Treatment: She has CAD s/p recent MI with PCIs  Either persistent symptoms from microvascular disease and/or inflammatory condition such as pericarditis  Will add Imdur and colchicine  No significant acute findings noted on echocardiogram   F/U with PMD and cardiologist as outpatient stable for discharge to home as per Dr Hugo

## 2020-01-14 NOTE — ED ADULT NURSE REASSESSMENT NOTE - NS ED NURSE REASSESS COMMENT FT1
Pt. admitted to Tele, No acute distress. Respirations even & unlabored. Report given to ESSU 2 NANCY Chang, pt. transported to ESSU 2. Pt. admitted to Tele, No acute distress. Denies chest pain. Respirations even & unlabored. Report given to ESSU 2 NANCY Chang, pt. transported to ESSU 2 at present.

## 2020-01-14 NOTE — CONSULT NOTE ADULT - SUBJECTIVE AND OBJECTIVE BOX
Cardiology/Vascular Medicine Inpatient Consultation Note      HISTORY OF PRESENT ILLNESS:  Patient is a 55 yo woman with h/o MI s/p DANNIE to 90% RCA and 90% OM lesions 2019 with staged PCI to mid-LAD 85% on 19 (performed at [facility](, where her cardiologist works), HTN, HLD who presents with chest pain. Pt reports she had severe chest pressure in 2019 which prompted her to go to [facility] where she had an MI and was stented. TTE reportedly with normal LV function at the time. Since then she was chest pain free until her staged procedure where she had to stay in the hospital due to chest pain. She left the next day and has been feeling fine until yesterday when she started to feel substernal chest pressure that is mild in nature but constant. States it felt similar to chest pain she had when she initially presented for ACS but states it is less severe. Describes it as pressure like and burning pain that does not radiate. Present at rest and on exertion. Pain also made worse when taking deep breaths. Associated shortness of breath with exertion that has been ongoing since stenting. No f/c, cough, abd pain, n/v/d, or other symptoms. Endorses medication adherence to DAPT (2020 20:57)    At the time of my exam, the patient appeared comfortable. Hemodynamically stable, without symptoms. Troponin trend appears flat and likely reflective of prior events and not active ACS.  Echocardiogram demonstrated normal biventricular systolic function    No further cardiac testing needed at this time.  Will add Imdur and colchicine to regimen.  Patient can be discharged home and will advise close f/u with her PMD and cardiologist.    Allergies  No Known Allergies    MEDICATIONS:  aspirin enteric coated 81 milliGRAM(s) Oral daily  enoxaparin Injectable 40 milliGRAM(s) SubCutaneous daily  isosorbide   mononitrate ER Tablet (IMDUR) 30 milliGRAM(s) Oral daily  metoprolol tartrate 25 milliGRAM(s) Oral two times a day  ticagrelor 90 milliGRAM(s) Oral every 12 hours  triamterene 37.5 mG/hydrochlorothiazide 25 mG Tablet 1 Tablet(s) Oral daily  acetaminophen   Tablet .. 650 milliGRAM(s) Oral every 6 hours PRN  atorvastatin 80 milliGRAM(s) Oral at bedtime  colchicine 0.6 milliGRAM(s) Oral daily    PAST MEDICAL & SURGICAL HISTORY:  Hyperlipidemia  MI (myocardial infarction)  HTN (hypertension)  H/O:     FAMILY HISTORY:  Family history of myocardial infarction: passed away at age 62    SOCIAL HISTORY:    As above    REVIEW OF SYSTEMS:  As above    PHYSICAL EXAM:  T(C): 36.6 (20 @ 05:55), Max: 37.1 (20 @ 22:45)  HR: 58 (20 @ 05:55) (58 - 68)  BP: 99/58 (20 @ 05:55) (99/58 - 147/63)  RR: 18 (20 @ 05:55) (17 - 20)  SpO2: 100% (20 @ 05:55) (99% - 100%)    Appearance: Normal	  HEENT:   Normal oral mucosa, PERRL, EOMI	  Lymphatic: No lymphadenopathy  Cardiovascular: Normal S1 S2, No JVD, No murmurs, No edema  Respiratory: Lungs clear to auscultation	  Psychiatry: Awake, alert  Gastrointestinal:  Soft, Non-tender, + BS	  Skin: No rashes, No ecchymoses, No cyanosis	  Neurologic: Non-focal  Extremities: Normal range of motion, No clubbing, cyanosis or edema  Vascular: Peripheral pulses palpable 2+ bilaterally      LABS:	 	                          11.0   3.04  )-----------( 238      ( 2020 06:13 )             34.8     14    141  |  103  |  10  ----------------------------<  100<H>  4.4   |  25  |  0.58      138  |  100  |  9   ----------------------------<  100<H>  3.6   |  27  |  0.59    Ca    9.9      2020 06:13  Ca    10.1      2020 16:00  Phos  4.4       Mg     2.0         TPro  8.5<H>  /  Alb  4.7  /  TBili  < 0.2<L>  /  DBili  x   /  AST  19  /  ALT  25  /  AlkPhos  70  01-13      proBNP: Serum Pro-Brain Natriuretic Peptide: 122.0 pg/mL (20 @ 16:00)    Lipid Profile: Cholesterol, rgeaj014 mg/dL<L> [120 - 199]  Direct LDL42 mg/dL  HDL Cholesterol, serum60 mg/dL [45 - 65]  Triglycerides, serum53 mg/dL [10 - 149]    HgA1c:   TSH: Thyroid Stimulating Hormone, Serum: 1.80 uIU/mL (20 @ 06:13)    < from: Xray Chest 2 Views PA/Lat (20 @ 17:51) >    EXAM:  XR CHEST PA LAT 2V        PROCEDURE DATE:  2020         INTERPRETATION:  CLINICAL INFORMATION: Chest pain and shortness of breath.    EXAM: PA and lateral radiographs of the chest.    COMPARISON: No prior study available.    FINDINGS:  No focal consolidation.  There is no pleural effusion or pneumothorax.  The cardiomediastinal silhouette is within normal limits.  The visualized osseous and soft tissue structures demonstrate no acute pathology.    IMPRESSION:   No focal consolidation.      BRE SUBRAMANIAN M.D., RADIOLOGY RESIDENT  This document has been electronically signed.  DALLAS MELO M.D., ATTENDING RADIOLOGIST  This document has been electronically signed. 2020  6:56AM            < from: Transthoracic Echocardiogram (20 @ 18:36) >    Patient name: TRAMAINE TURPIN  YOB: 1965   Age: 54 (F)   MR#: [id number]  Study Date: 2020  Location: O/PSonographer: MELISSA Delcid  Study quality: Technically good  Referring Physician: Not Available Doctor, MD  BloodPressure: 147/63 mmHg  Height: 152 cm  Weight: 73 kg  BSA: 1.7 m2  Heart Rate: 52 mmHg  ------------------------------------------------------------------------  PROCEDURE: Transthoracic echocardiogram with 2-D, M-Mode  and complete spectral and color flow Doppler.  INDICATION: Chest pain, unspecified (R07.9)  ------------------------------------------------------------------------  DIMENSIONS:  Dimensions:     Normal Values:  LA:     3.6 cm    2.0 - 4.0 cm  Ao:     3.0 cm    2.0 - 3.8 cm  SEPTUM: 0.8 cm    0.6 - 1.2 cm  PWT:    0.8 cm    0.6 - 1.1 cm  LVIDd:  4.9 cm    3.0 - 5.6 cm  LVIDs:  2.8 cm    1.8 - 4.0 cm  Derived Variables:  LVMI: 77 g/m2  RWT: 0.32  Fractional short: 43 %  Ejection Fraction (Visual Estimate): 60-65 %  Peak Velocity(m/sec): AoV=1.2  ------------------------------------------------------------------------  OBSERVATIONS:  Mitral Valve: Normal mitral valve. Minimal mitral  regurgitation.  Aortic Root: Aortic Root: 3.0 cm.  Aortic Valve: Normal trileaflet aortic valve. Peak  transaortic valve gradient equals 5 mm Hg, estimated aortic  valve area equals 2.1 sqcm. No aortic valve regurgitation  seen. Peak left ventricular outflow tract gradient equals 4  mm Hg.  Left Atrium: Normal left atrium.  LA volume index =26  cc/m2.  Left Ventricle: Normal left ventricular systolic function.  No segmental wall motion abnormalities. Normal left  ventricular internal dimensions and wall thicknesses.  Normal left ventricular diastolic function.  Right Heart: Normal rightatrium. Normal right ventricular  size and function. Normal tricuspid valve. No tricuspid  regurgitation. Normal pulmonic valve. Minimal pulmonic  regurgitation.  Pericardium/PleuraNormal pericardium with no pericardial  effusion.  Hemodynamic: Estimated right ventricular systolic pressure  equals 15 mm Hg, assuming right atrial pressure equals 10  mm Hg, consistent with normal pulmonary pressures.  ------------------------------------------------------------------------  CONCLUSIONS:  1. Normal left ventricular internal dimensions and wall  thicknesses.  2. Normal left ventricular systolic function. No segmental  wall motion abnormalities.  3. Normal left ventricular diastolic function.  4. Normal right ventricular size and function.  5. Estimated right ventricular systolic pressure equals 15  mm Hg, assuming right atrial pressure equals 10 mm Hg,  consistent with normal pulmonary pressures.  *** No previous Echo exam.  ------------------------------------------------------------------------  Confirmed on  2020 - 19:58:32 by Brant Montoya M.D.  ------------------------------------------------------------------------    < end of copied text >

## 2020-01-14 NOTE — DISCHARGE NOTE PROVIDER - NSDCMRMEDTOKEN_GEN_ALL_CORE_FT
Aspir 81 oral delayed release tablet: 1 tab(s) orally once a day  Brilinta (ticagrelor) 90 mg oral tablet: 1 tab(s) orally 2 times a day  colchicine 0.6 mg oral tablet: 1 tab(s) orally once a day  isosorbide mononitrate 30 mg oral tablet, extended release: 1 tab(s) orally once a day  metoprolol tartrate 25 mg oral tablet: 1 tab(s) orally 2 times a day  rosuvastatin 20 mg oral tablet: 1 tab(s) orally once a day  triamterene-hydrochlorothiazide 37.5 mg-25 mg oral capsule: 1 cap(s) orally once a day

## 2020-01-14 NOTE — CONSULT NOTE ADULT - ASSESSMENT
CAD s/p recent MI with PCIs  Either persistent symptoms from microvascular disease and/or inflammatory condition such as pericarditis  Will add Imdur and colchicine  No significant acute findings noted on echocardiogram   Plan for discharge today  F/U with PMD and cardiologist

## 2020-02-07 ENCOUNTER — INPATIENT (INPATIENT)
Facility: HOSPITAL | Age: 55
LOS: 2 days | Discharge: HOME | End: 2020-02-10
Attending: HOSPITALIST | Admitting: HOSPITALIST
Payer: COMMERCIAL

## 2020-02-07 VITALS
HEART RATE: 66 BPM | OXYGEN SATURATION: 100 % | TEMPERATURE: 98 F | SYSTOLIC BLOOD PRESSURE: 181 MMHG | DIASTOLIC BLOOD PRESSURE: 84 MMHG | RESPIRATION RATE: 18 BRPM

## 2020-02-07 DIAGNOSIS — Z98.891 HISTORY OF UTERINE SCAR FROM PREVIOUS SURGERY: Chronic | ICD-10-CM

## 2020-02-07 PROBLEM — E78.5 HYPERLIPIDEMIA, UNSPECIFIED: Chronic | Status: ACTIVE | Noted: 2020-01-13

## 2020-02-07 PROBLEM — I21.9 ACUTE MYOCARDIAL INFARCTION, UNSPECIFIED: Chronic | Status: ACTIVE | Noted: 2020-01-13

## 2020-02-07 LAB
ALBUMIN SERPL ELPH-MCNC: 4.6 G/DL — SIGNIFICANT CHANGE UP (ref 3.3–5)
ALP SERPL-CCNC: 62 U/L — SIGNIFICANT CHANGE UP (ref 40–120)
ALT FLD-CCNC: 27 U/L — SIGNIFICANT CHANGE UP (ref 4–33)
ANION GAP SERPL CALC-SCNC: 11 MMO/L — SIGNIFICANT CHANGE UP (ref 7–14)
ANISOCYTOSIS BLD QL: SLIGHT — SIGNIFICANT CHANGE UP
APTT BLD: 31.5 SEC — SIGNIFICANT CHANGE UP (ref 27.5–36.3)
AST SERPL-CCNC: 23 U/L — SIGNIFICANT CHANGE UP (ref 4–32)
BASOPHILS # BLD AUTO: 0.03 K/UL — SIGNIFICANT CHANGE UP (ref 0–0.2)
BASOPHILS NFR BLD AUTO: 0.8 % — SIGNIFICANT CHANGE UP (ref 0–2)
BASOPHILS NFR SPEC: 0.9 % — SIGNIFICANT CHANGE UP (ref 0–2)
BILIRUB SERPL-MCNC: 0.2 MG/DL — SIGNIFICANT CHANGE UP (ref 0.2–1.2)
BLASTS # FLD: 0 % — SIGNIFICANT CHANGE UP (ref 0–0)
BUN SERPL-MCNC: 11 MG/DL — SIGNIFICANT CHANGE UP (ref 7–23)
CALCIUM SERPL-MCNC: 10.2 MG/DL — SIGNIFICANT CHANGE UP (ref 8.4–10.5)
CHLORIDE SERPL-SCNC: 98 MMOL/L — SIGNIFICANT CHANGE UP (ref 98–107)
CO2 SERPL-SCNC: 27 MMOL/L — SIGNIFICANT CHANGE UP (ref 22–31)
CREAT SERPL-MCNC: 0.59 MG/DL — SIGNIFICANT CHANGE UP (ref 0.5–1.3)
CRP SERPL-MCNC: < 4 MG/L — SIGNIFICANT CHANGE UP
EOSINOPHIL # BLD AUTO: 0.07 K/UL — SIGNIFICANT CHANGE UP (ref 0–0.5)
EOSINOPHIL NFR BLD AUTO: 1.9 % — SIGNIFICANT CHANGE UP (ref 0–6)
EOSINOPHIL NFR FLD: 0 % — SIGNIFICANT CHANGE UP (ref 0–6)
ERYTHROCYTE [SEDIMENTATION RATE] IN BLOOD: 20 MM/HR — SIGNIFICANT CHANGE UP (ref 4–25)
GIANT PLATELETS BLD QL SMEAR: PRESENT — SIGNIFICANT CHANGE UP
GLUCOSE SERPL-MCNC: 90 MG/DL — SIGNIFICANT CHANGE UP (ref 70–99)
HCT VFR BLD CALC: 36.3 % — SIGNIFICANT CHANGE UP (ref 34.5–45)
HGB BLD-MCNC: 11.6 G/DL — SIGNIFICANT CHANGE UP (ref 11.5–15.5)
HYPOCHROMIA BLD QL: SLIGHT — SIGNIFICANT CHANGE UP
IMM GRANULOCYTES NFR BLD AUTO: 0 % — SIGNIFICANT CHANGE UP (ref 0–1.5)
INR BLD: 1.03 — SIGNIFICANT CHANGE UP (ref 0.88–1.17)
LYMPHOCYTES # BLD AUTO: 1.73 K/UL — SIGNIFICANT CHANGE UP (ref 1–3.3)
LYMPHOCYTES # BLD AUTO: 47.5 % — HIGH (ref 13–44)
LYMPHOCYTES NFR SPEC AUTO: 46.9 % — HIGH (ref 13–44)
MCHC RBC-ENTMCNC: 26.3 PG — LOW (ref 27–34)
MCHC RBC-ENTMCNC: 32 % — SIGNIFICANT CHANGE UP (ref 32–36)
MCV RBC AUTO: 82.3 FL — SIGNIFICANT CHANGE UP (ref 80–100)
METAMYELOCYTES # FLD: 0 % — SIGNIFICANT CHANGE UP (ref 0–1)
MONOCYTES # BLD AUTO: 0.4 K/UL — SIGNIFICANT CHANGE UP (ref 0–0.9)
MONOCYTES NFR BLD AUTO: 11 % — SIGNIFICANT CHANGE UP (ref 2–14)
MONOCYTES NFR BLD: 8.7 % — SIGNIFICANT CHANGE UP (ref 2–9)
MYELOCYTES NFR BLD: 0 % — SIGNIFICANT CHANGE UP (ref 0–0)
NEUTROPHIL AB SER-ACNC: 37.4 % — LOW (ref 43–77)
NEUTROPHILS # BLD AUTO: 1.41 K/UL — LOW (ref 1.8–7.4)
NEUTROPHILS NFR BLD AUTO: 38.8 % — LOW (ref 43–77)
NEUTS BAND # BLD: 0 % — SIGNIFICANT CHANGE UP (ref 0–6)
NRBC # FLD: 0 K/UL — SIGNIFICANT CHANGE UP (ref 0–0)
OTHER - HEMATOLOGY %: 0 — SIGNIFICANT CHANGE UP
OVALOCYTES BLD QL SMEAR: SLIGHT — SIGNIFICANT CHANGE UP
PLATELET # BLD AUTO: 240 K/UL — SIGNIFICANT CHANGE UP (ref 150–400)
PLATELET COUNT - ESTIMATE: NORMAL — SIGNIFICANT CHANGE UP
PMV BLD: 9.7 FL — SIGNIFICANT CHANGE UP (ref 7–13)
POIKILOCYTOSIS BLD QL AUTO: SLIGHT — SIGNIFICANT CHANGE UP
POLYCHROMASIA BLD QL SMEAR: SLIGHT — SIGNIFICANT CHANGE UP
POTASSIUM SERPL-MCNC: 3.6 MMOL/L — SIGNIFICANT CHANGE UP (ref 3.5–5.3)
POTASSIUM SERPL-SCNC: 3.6 MMOL/L — SIGNIFICANT CHANGE UP (ref 3.5–5.3)
PROMYELOCYTES # FLD: 0 % — SIGNIFICANT CHANGE UP (ref 0–0)
PROT SERPL-MCNC: 8.3 G/DL — SIGNIFICANT CHANGE UP (ref 6–8.3)
PROTHROM AB SERPL-ACNC: 11.8 SEC — SIGNIFICANT CHANGE UP (ref 9.8–13.1)
RBC # BLD: 4.41 M/UL — SIGNIFICANT CHANGE UP (ref 3.8–5.2)
RBC # FLD: 13.3 % — SIGNIFICANT CHANGE UP (ref 10.3–14.5)
SODIUM SERPL-SCNC: 136 MMOL/L — SIGNIFICANT CHANGE UP (ref 135–145)
VARIANT LYMPHS # BLD: 6.1 % — SIGNIFICANT CHANGE UP
WBC # BLD: 3.64 K/UL — LOW (ref 3.8–10.5)
WBC # FLD AUTO: 3.64 K/UL — LOW (ref 3.8–10.5)

## 2020-02-07 PROCEDURE — 70553 MRI BRAIN STEM W/O & W/DYE: CPT | Mod: 26

## 2020-02-07 PROCEDURE — 70543 MRI ORBT/FAC/NCK W/O &W/DYE: CPT | Mod: 26

## 2020-02-07 PROCEDURE — 70496 CT ANGIOGRAPHY HEAD: CPT | Mod: 26

## 2020-02-07 PROCEDURE — 70498 CT ANGIOGRAPHY NECK: CPT | Mod: 26

## 2020-02-07 NOTE — ED CDU PROVIDER INITIAL DAY NOTE - OBJECTIVE STATEMENT
HPI: Patient is a 54 y.o female with PMHx of CAD with PCI (brilinta and ASA), HTN, HLD, DM, who presents to ED c/o Lt eye pressure, blurry vision and Lt arm numbness. Pt states that last night sx began with Lt arm numbness then in the morning woke up with pressure behind lt eye, then noticed Lt eye subconjunctival hemorrhage. Notes some Lt eye blurry vision, does use glasses for distance. Denies cp, sob, dizziness, n/v/d, trauma/falls, headache or any other complaints. Pt seen and worked up in ED, CTA's negative, Labs wnl. Pt seen by Ophtho and cleared from their standpoint. Neuro consulted recommend MR's. Pt transferred to CDU for MR head and orbits, vitals q4, and frequent re-evals.

## 2020-02-07 NOTE — ED PROVIDER NOTE - PHYSICAL EXAMINATION
Rea Milan M.D.:   patient awake alert NAD .   LUNGS CTAB no wheeze no crackle.   CARD RRR no m/r/g.    Abdomen soft NT ND no rebound no guarding no CVA tenderness.   EXT WWP no edema no calf tenderness CV 2+DP/PT bilaterally.   neuro A&Ox3 cn 2-12 intact gross motor and sensory intact in all extremities gait normal.    skin warm and dry no rash  HEENT: moist mucous membranes, PERRL, EOMI +left temporal tenderness. +subconjunctival hemorrahge to left eye, rest of conjunctiva not red.

## 2020-02-07 NOTE — ED CDU PROVIDER INITIAL DAY NOTE - ATTENDING CONTRIBUTION TO CARE
Rea Milan M.D: 54F hx htn hld cad with stents arrives w/ complaint of itnermittent LUE numbness, now with cosntant left eye pressure, blurry vision and temporal pain on exam. initial concern was for temporal arteritis, but inflammatory markers were negative. CT/CTA in ER negative for acute pathology. seen by ophtho who found no primary ophtho reason for symptoms. seen by neuro who were concerned for optic neuritis/MS. given concern pt placed in CDU for MRI and reeval pending MRI result.

## 2020-02-07 NOTE — ED ADULT NURSE NOTE - OBJECTIVE STATEMENT
Pt with co left arm numbness since yesterday . Pt reports left face numbness and noticed that her left eye has broken vessels. Pt b/p elevated pt took medications.  Patient to room 25. Pt alert and oriented times four. Pt has family at bedside and is being evaluated by MD. IVLock placed to left antecubital 20 gauge and labs drawn and sent. Pt waiting for results, further orders and disposition.   NANCY Malik

## 2020-02-07 NOTE — CONSULT NOTE ADULT - ASSESSMENT
Impression:    Intraocular pathology vs. Migraine vs. less likely GCA (no vision loss, normal ESR/CRP). 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   - MRI Brain w/wo w/ thin cuts through orbit (can be performed outpatient if Ophtha reports intraorbital etiology)  - Symptomatic management of HA if present    - Reglan, Benadryl, Toradol (NSAID if cleared by ophtha)  - Continue DAPT for cardiac stents and intracranial athero

## 2020-02-07 NOTE — ED PROVIDER NOTE - OBJECTIVE STATEMENT
Rea Milan M.D: 54F hx CAD w/ multiple stents, most recent 3 weeks ago, on asa, plavix p/w multiple complaints. notes intermittent LUE numbness/tingling since last night. today woke up with redness to left eye, significant pressure behind eye with blurry vision isolated to that eye. no jaw pain no pain anywhere else never had anything like this before no cp no sob.

## 2020-02-07 NOTE — ED CDU PROVIDER INITIAL DAY NOTE - MEDICAL DECISION MAKING DETAILS
Patient is a 54 y.o female with PMHx of CAD with PCI (brilinta and ASA), HTN, HLD, DM, who presents to ED c/o Lt eye pressure, blurry vision and Lt arm numbness. Pt seen and worked up in ED, CTA's negative, Labs wnl. Pt seen by Ophtho and cleared from their standpoint. Neuro consulted recommend MR's. Pt transferred to CDU for MR head and orbits, vitals q4, and frequent re-evals.

## 2020-02-07 NOTE — CONSULT NOTE ADULT - SUBJECTIVE AND OBJECTIVE BOX
[facility] DEPARTMENT OF OPHTHALMOLOGY - INITIAL ADULT CONSULT  -----------------------------------------------------------------------------  Faraz Henry MD PGY-2  Pager: [phone]/[facility]: [id number]  -----------------------------------------------------------------------------    HPI: 54F h/o cardiac disease woke up this morning with subconj heme OS. Also reports she felt pressure behind the left eye. Denies blurry vision or pain w/o EOM. No jaw claudication, fevers, weight loss, chills, joint aches, temporal pain.     PAST MEDICAL & SURGICAL HISTORY:  Hyperlipidemia  MI (myocardial infarction)  HTN (hypertension)  H/O:     Past Ocular History: denies surgery/laser  FAMILY HISTORY: Family history of myocardial infarction (Mother): passed away at age 62  Social History: denies etoh/tobacco  Ophthalmic Medications: none  Allergies & Intolerances: NKDA    Review of Systems:  Constitutional: No fever, chills  Eyes: No blurry vision, flashes, floaters, FBS, erythema, discharge, double vision, OU +redness OS  Neuro: No tremors  Cardiovascular: No chest pain, palpitations  Respiratory: No SOB, no cough  GI: No nausea, vomiting, abdominal pain  : No dysuria  Skin: no rash  Psych: no depression  Endocrine: no polyuria, polydipsia  Heme/lymph: no swelling    VITALS: T(C): 36.7 (20 @ 17:46)  T(F): 98 (20 @ 17:46), Max: 98.2 (20 @ 15:03)  HR: 58 (20 @ 17:46) (58 - 66)  BP: 133/81 (20 @ 17:46) (133/81 - 181/84)  RR:  (17 - 18)  SpO2:  (100% - 100%)  Wt(kg): --  General: AAO x 3, appropriate mood and affect    Ophthalmology Exam:  Visual acuity (sc): 20/20 OU  Pupils: PERRL OU, no APD  Ttono: 14/10  Extraocular movements (EOMs): Full OU, no pain, no diplopia  Confrontational Visual Field (CVF): Full OU  Color Plates: 12/12 OU    Slit lamp exam  External: Flat OU  Lids/Lashes/Lacrimal Ducts: Flat OU    Sclera/Conjunctiva: W+Q OD, limited BORIS from 2-3 o'clock OS, no abrasions  Cornea: Cl OU  Anterior Chamber: D+Q OU    Iris: Flat OU  Lens: 1+NS    Fundus Exam: dilated with 1% tropicamide and 2.5% phenylephrine  Approval obtained from primary team for dilation  Patient aware that pupils can remained dilated for at least 4-6 hours  Exam performed with 20D lens    Vitreous: wnl OU  Disc, cup/disc: sharp and pink, 0.4 OU  Macula: wnl OU  Vessels: wnl OU  Periphery: wnl OU    Labs:   ESR 20  CRP <4  Plts 240    Assessment and Recommendations:  54y female w/ h/o cardiac disease, here with eye redness and pain. On exam, BCVA 20/20 OU, no APD, color full OU, EOM intact and no pain w/ EOM OU, CVF full OU, IOP wnl OU. Slit lamp exam w/ BORIS 2-3 o'clock OS, otherwise wnl. Posterior exam wnl. ESR 20 and CRP < 4.   - given normal labs and normal exam, unlikely to have GCA  - no opthalmologic etiology for pain, consider tension type headache or migraine or further neurological w/u for pain  - follow-up Monday  - D/W Dr. Ulloa (Chief)    Outpatient follow-up: Patient should follow-up with his/her ophthalmologist or with [facility] Department of Ophthalmology on Monday at:    [address]  [place] [address]  [phone]    Faraz Henry MD, PGY-2  Pager: [phone]/[facility]: [id number]

## 2020-02-07 NOTE — ED PROVIDER NOTE - PROGRESS NOTE DETAILS
Rea Milan M.D: esr/crp negative. steroids held (took a while to mix in pharmacy labs and imaging unremarkable.ophtho evaluated no concern for ophtho etiology. seen by neurop concern for optic neuritis. plan for cdu for mri.

## 2020-02-07 NOTE — ED ADULT NURSE NOTE - NS ED NURSE PATIENT LEFT UNIT TIME
Cosmo Jain M.D  [address] [place] [address] [address] [facility] [address]  Phone [phone] Fax [phone]  Pulmonary Critical Care & Sleep Medicine       Name: Raman Paniagua. MRN: [id number]   : 1944 Hospital: [facility]   Date: 2018        CCM follow up     Requesting MD:                                                  Reason for CC Consult:    IMPRESSION:   Patient Active Problem List   Diagnosis Code    Hypercapnia R06.89    Acute on chronic respiratory failure with hypoxia and hypercapnia (HCC) J96.21, J96.22    CLL (chronic lymphocytic leukemia) ([facility]) C91.90    Emphysema lung ([address] J43.9    Memory loss R41.3    Acute encephalopathy G93.40    Elevated serum creatinine R79.89 ·   AMS ? Due to co2 narcosis but back to baseline, ? Due to htn urgency and htn encephalopathy, ? Delirium due to etoh  · Advance dementia per notes  · Possible fluid overload due to elevated BP and right sided effusion   · COPD with FEV1 of 1.23 or 55% predicted but normal ratio follows with Dr. Brent Grimm on Spiriva and advair at home- Recent 6 min walk in may suggested requires home oxygen 2-4 liter  · RUPERTO unknown severity on BIPAP2 non compliant   PLAN:  -- Add provera  -- Increase coreg bp better still little elevated   -- DC ativan  --On and off confusion will increase zyprexa not sure sundowning or dementia  -- Decrease/stop lasix as Cr going up  -- Back to baseline oxygen 2-4 liter  -- Elevated WBC ?  Due to CLL vs Steroids  --cxr is better less effusion   -- No wheezing on exam will decrease solumedrol-- 20Q12 and po from tomorrow  -- On levaquin recently treated for sepsis at [facility] MS will recheck cultures and sputum culture will continue levaquin <sepsis was 3 month ago> adjust per culture-- Blood culture negative sputum never sent-- Change to PO on   -- On thiamine and folic acid due to ETOH history  -- Monitor for DTs  -- BIPAP  at night and prn  -- Advance demential lives alone will need long term care and placement  -- OT and PT to evaluate    -- Overall doing well obut on and off drowsy with elevated CO2 will monitor in ICU    CVS:BP is elevated monitor with Coreg Norvasc and prn meds,   RS:Steroids BD, Aspiration precaution, Keep HOB elevated, Use bipap 21/17 at night <home setting>  ID:On levaquin check culture and adjsut -- Change to PO give total 5-7 days for LRTI  ENDO:ssi  GI:PPI diet as tolerated   RENAL:Monitor Cr -- Mild improvement noted decrease Banana bag to 50/hr  CNS:AMS multifactorial treat for HTN, Start Thiamine and Folic acid ? [employer]  HEMATOLOGY:CLL by history WBC normal monitor if needed consider hematology consult  MUSCULOSKELETAL:no acute issue  PAIN AND SEDATION: zyprexa  · Skin/Wound: No acute issue  · Electrolytes: Replace electrolytes per ICU electrolyte replacement protocol. · IVF: Banana Bag  · Nutrition: Diet as tolerated  · Prophylaxis: DVT Prophylaxis with Heparin,. GI Prophylaxis. · Restraints: none  · PT/OT eval and treat. OOB when appropriate. · Lines/Tubes: none. No muller or central line. ADVANCE DIRECTIVE:Full code needs palliative consult   FAMILY DISCUSSION:spoke with patinent no one else available  Quality Care: PPI, DVT prophylaxis, HOB elevated, Infection control all reviewed and addressed. Events and notes from last 24 hours reviewed. Care plan discussed with nursing CC TIME:35 min    · Labs and images personally seen and available reports reviewed. · All current medicines are reviewed and doses and prescription adjusted. Subjective/History:  7/6/18  Doing well BP is better  On and off confused at night ?  Due to underline dementia vs worsening CO2 went up to 88 BIPAP adjusted in am ABG done on oxygen was doing better with Ph and PCO2 improvement but again started becoming drowsy   Oxygenation wise ok on 2-3 liter  Currently have a sitter   WBC elevated 22 but on steroids and h/o CLL Ronni Ontiveros. is a 75 y.o. male who hx emphysema, htn,memorry loss, CLL was sent to ER due to ams. Per er reported he acting abnormal since morning, refused to eat/drinking/taking medication. He responded slowly and lethargic. In ER, abg indicated pH 7.294. PCO2 85, pO2 69, HCO3 41.3.  bipap was put on. Ct head was pending. cxr: Linear opacity at the left lung base appears similar, could be scarring or partial atelectasis. There is mild crowding of basilar pulmonary  Recently admitted to [facility] with respiratory failure, Aspiration PNA  Advance dementia recent fall  Overnight found to be agitated and confused managed with sitter  ABG showed improvement   On home BIPAP 21/17   H/O ETOH per notes     Prior to Admission medications    Medication Sig Start Date End Date Taking? Authorizing Provider   alfuzosin SR (UROXATRAL) 10 mg SR tablet Take 10 mg by mouth daily. Yes Ronni Hernandez MD   aspirin 81 mg chewable tablet Take 81 mg by mouth daily. Yes Ronni Hernandez MD   cholecalciferol (VITAMIN D3) 1,000 unit cap Take  by mouth daily. Yes Ronni Hernandez MD   cycloSPORINE (RESTASIS) 0.05 % ophthalmic emulsion Administer 1 Drop to both eyes two (2) times a day. Yes Ronni Hernandez MD   diclofenac (VOLTAREN) 1 % gel Apply  to affected area four (4) times daily. Yes Ronni Hernandez MD   famotidine (PEPCID) 20 mg tablet Take 20 mg by mouth two (2) times a day. Yes Ronni Hernandez MD   latanoprost (XALATAN) 0.005 % ophthalmic solution Administer 1 Drop to both eyes nightly. Yes Ronni Hernandez MD   allopurinol (ZYLOPRIM) 100 mg tablet Take  by mouth daily. Ronni Hernandez MD   carvedilol (COREG) 6.25 mg tablet Take  by mouth two (2) times daily (with meals). Ronni Hernandez MD   furosemide (LASIX) 40 mg tablet Take  by mouth daily. Ronni Hernandez MD   Oxygen     Ronni Hernandez MD   pravastatin sodium (PRAVASTATIN PO) Take  by mouth.     Ronni Hernandez MD   tiotropium (SPIRIVA WITH HANDIHALER) 18 mcg inhalation capsule Take 1 Cap by inhalation daily. Ronni Hernandez MD   fluticasone/salmeterol (ADVAIR HFA IN) Take  by inhalation. Ronni Hernandez MD   ALBUTEROL IN Take  by inhalation. Ronni Hernandez MD     Current Facility-Administered Medications   Medication Dose Route Frequency    0.9% sodium chloride 1,000 mL with mvi, adult no. 4 with vit K 10 mL, thiamine 125 mg, folic acid 1 mg infusion   IntraVENous DAILY    carvedilol (COREG) tablet 12.5 mg  12.5 mg Oral BID WITH MEALS    medroxyPROGESTERone (PROVERA) tablet 10 mg  10 mg Oral DAILY    methylPREDNISolone (PF) (SOLU-MEDROL) injection 20 mg  20 mg IntraVENous Q12H    OLANZapine (ZyPREXA) tablet 2.5 mg  2.5 mg Oral BID    amLODIPine (NORVASC) tablet 10 mg  10 mg Oral DAILY    nitroglycerin (NITRODUR) 0.2 mg/hr patch 1 Patch  1 Patch TransDERmal Q24H    docusate sodium (COLACE) capsule 100 mg  100 mg Oral BID    heparin (porcine) injection 5,000 Units  5,000 Units SubCUTAneous Q8H    famotidine (PF) (PEPCID) 20 mg in sodium chloride 0.9% 10 mL injection  20 mg IntraVENous Q12H    arformoterol (BROVANA) neb solution 15 mcg  15 mcg Nebulization BID RT    budesonide (PULMICORT) 500 mcg/2 ml nebulizer suspension  500 mcg Nebulization BID RT    albuterol-ipratropium (DUO-NEB) 2.5 MG-0.5 MG/3 ML  3 mL Nebulization Q4H RT    levoFLOXacin (LEVAQUIN) 500 mg in D5W IVPB  500 mg IntraVENous Q24H     No Known Allergies   Social History   Substance Use Topics    Smoking status: Former Smoker    Smokeless tobacco: Never Used    Alcohol use No      Comment: former ETOH user; stopped 2017      No family history on file.      Objective:   Vital Signs:    Visit Vitals    /60    Pulse 89    Temp 97.3 °F (36.3 °C)    Resp (!) 40    Ht 5' 6\" (1.676 m)    Wt 90 kg (198 lb 6.6 oz)    SpO2 97%    BMI 32.02 kg/m2       O2 Device: Nasal cannula   O2 Flow Rate (L/min): 2 l/min   Temp (24hrs), Av.6 °F (36.4 °C), Min:96.7 °F (35.9 °C), Max:98.2 °F (36.8 °C)       Intake/Output: Last shift:         Last 3 shifts: 07/04 1901 - 07/06 0700  In: 1637 [P.O.:1320; I.V.:1385]  Out: 850 [Urine:850]    Intake/Output Summary (Last 24 hours) at 07/06/18 1218  Last data filed at 07/05/18 2201   Gross per 24 hour   Intake          1573.75 ml   Output              400 ml   Net          1173.75 ml       Ventilator Settings:  Mode Rate Tidal Volume Pressure FiO2 PEEP            28 %       Peak airway pressure:      Minute ventilation: 10 l/min        Review of Systems:  Not able to do due to patient condition     Objective:    General: comfortable, no acute distress altered not oriented  HEENT: pupils reactive, sclera anicteric, EOM intact  Neck: No adenopathy or thyroid swelling, no lymphadenopathy or JVD, supple  CVS: S1S2 no murmurs  RS: Mod AE bilaterally, decrease at base  Abd: soft, non tender, no hepatosplenomegaly  Neuro: non focal, awake, alert  Extrm: no leg edema, clubbing or cyanosis  Lymph node: No obvious palpable lymph node appreciated.   Skin: no rash  CBC w/Diff Recent Labs      07/06/18   0301  07/05/18   0630  07/04/18   0254   WBC  22.0*  20.4*  13.7*   RBC  4.93  4.99  4.99   HGB  12.0*  12.0*  12.2*   HCT  41.2  40.5  41.3   PLT  221  255  243   GRANS  38*  64  64   LYMPH  55*  30  34   EOS  0  0  0        Chemistry Recent Labs      07/06/18   0301  07/05/18   1701  07/05/18   0630  07/04/18   0254  07/03/18   1640   GLU  132*  139*  139*  167*  89   NA  140  141  142  139  141   K  5.1  4.4  4.3  3.8  4.4   CL  103  102  102  98*  101   CO2  35*  31  36*  37*  37*   BUN  41*  40*  32*  16  17   CREA  1.55*  1.74*  1.65*  1.30  1.17   CA  8.9  9.6  9.1  9.4  9.0   MG  2.5   --   2.2  1.8   --    AGAP  2*  8  4  4  3   BUCR  26*  23*  19  12  15   AP   --   94   --    --   99   TP   --   8.7*   --    --   8.6*   ALB   --   3.5   --    --   3.6   GLOB   --   5.2*   --    --   5.0*   AGRAT   --   0.7*   --    --   0.7*        Lactic Acid Lactic acid   Date Value Ref Range Status 06/16/2018 1.3 0.4 - 2.0 MMOL/L Final     No results for input(s): LAC in the last 72 hours. Micro  Recent Labs      07/04/18   1433  07/04/18   1337   CULT  NO GROWTH 2 DAYS  NO GROWTH 2 DAYS     Recent Labs      07/04/18   1433  07/04/18   1337   CULT  NO GROWTH 2 DAYS  NO GROWTH 2 DAYS        ABG Recent Labs      07/06/18   1042  07/06/18   0857  07/06/18   0509   PHI  7.303*  7.257*  7.235*   PCO2I  83.3*  88.9*  94.1*   PO2I  74*  108*  98   HCO3I  41.3*  39.6*  40.1*   FIO2I  28  28  28        Liver Enzymes Protein, total   Date Value Ref Range Status   07/05/2018 8.7 (H) 6.4 - 8.2 g/dL Final     Albumin   Date Value Ref Range Status   07/05/2018 3.5 3.4 - 5.0 g/dL Final     Globulin   Date Value Ref Range Status   07/05/2018 5.2 (H) 2.0 - 4.0 g/dL Final     A-G Ratio   Date Value Ref Range Status   07/05/2018 0.7 (L) 0.8 - 1.7   Final     AST (SGOT)   Date Value Ref Range Status   07/05/2018 20 15 - 37 U/L Final     Alk. phosphatase   Date Value Ref Range Status   07/05/2018 94 45 - 117 U/L Final     Recent Labs      07/05/18   1701  07/03/18   1640   TP  8.7*  8.6*   ALB  3.5  3.6   GLOB  5.2*  5.0*   AGRAT  0.7*  0.7*   SGOT  20  26   AP  94  99        Cardiac Enzymes No results found for: CPK, CK, CKMMB, CKMB, RCK3, CKMBT, CKNDX, CKND1, LOREN, TROPT, TROIQ, APRIL, TROPT, TNIPOC, BNP, BNPP     BNP No results found for: BNP, BNPP, XBNPT     Coagulation No results for input(s): PTP, INR, APTT in the last 72 hours.     No lab exists for component: INREXT, INREXT      Thyroid  No results found for: T4, T3U, TSH, TSHEXT, TSHEXT       Lipid Panel No results found for: CHOL, CHOLPOCT, CHOLX, CHLST, CHOLV, 418517, HDL, LDL, LDLC, DLDLP, 114186, VLDLC, VLDL, TGLX, TRIGL, TRIGP, TGLPOCT, CHHD, CHHDX       Urinalysis Lab Results   Component Value Date/Time    Color YELLOW 07/03/2018 04:15 PM    Appearance CLEAR 07/03/2018 04:15 PM    Specific gravity 1.019 07/03/2018 04:15 PM    pH (UA) 7.0 07/03/2018 04:15 PM Protein 300 (A) 07/03/2018 04:15 PM    Glucose NEGATIVE  07/03/2018 04:15 PM    Ketone NEGATIVE  07/03/2018 04:15 PM    Bilirubin NEGATIVE  07/03/2018 04:15 PM    Urobilinogen 0.2 07/03/2018 04:15 PM    Nitrites NEGATIVE  07/03/2018 04:15 PM    Leukocyte Esterase NEGATIVE  07/03/2018 04:15 PM    Epithelial cells FEW 07/03/2018 04:15 PM    Bacteria FEW (A) 07/03/2018 04:15 PM    WBC 0 to 3 07/03/2018 04:15 PM    RBC NEGATIVE  07/03/2018 04:15 PM        XR (Most Recent). CXR reviewed by me and compared with previous CXR   Results from Hospital Encounter encounter on 07/03/18   XR CHEST PORT   Narrative Chest, single view    Indication: Hypoxia    Comparison: Several prior exams, most recently July 5, 2018    Findings:  Portable upright AP view of the chest was obtained. Linear opacity at the left lung base is demonstrated, similar to prior exams. No  focal pneumonic opacity. No pneumothorax or pleural effusion. Stable cardiac  size and mediastinal contours. No acute osseous abnormality. Impression Impression:  Left basilar atelectasis or scarring, similar to prior, without superimposed  acute radiographic abnormality. CT (Most Recent)   Results from Hospital Encounter encounter on 07/03/18   CT HEAD WO CONT   Narrative EXAM: CT head    INDICATION: Altered mental status. Decreased alertness. COMPARISON: 6/16/2018    TECHNIQUE: Axial CT imaging of the head was performed without intravenous  contrast. One or more dose reduction techniques were used on this CT: automated  exposure control, adjustment of the mAs and/or kVp according to patient's size,  and iterative reconstruction techniques. The specific techniques utilized on  this CT exam have been documented in the patient's electronic medical record.    _______________    FINDINGS:    BRAIN:  Intraparenchymal hemorrhage: None. Mass effect/edema: None. Infarcts/encephalomalacia: None.   White matter: Normal.  Brain volume: Normal for age.    EXTRA-AXIAL SPACES:  Hemorrhage: None. Mass: None. Hydrocephalus: None. SINUSES: Clear. CALVARIUM: Unremarkable. OTHER: None.    _______________         Impression IMPRESSION:     No evidence of acute intracranial process. EKG No results found for this or any previous visit. ECHO  12/2017 [facility]    NORMAL LEFT VENTRICULAR CAVITY SIZE. MODERATE CONCENTRIC LEFT VENTRICULAR HYPERTROPHY. NORMAL LEFT VENTRICULAR SYSTOLIC FUNCTION WITH AN EJECTION FRACTION   ESTIMATED AT 60%. MILD  DIASTOLIC DYSFUNCTION. NORMAL LEFT ATRIAL SIZE. PRIOR ECHO REPORT FINDINGS ON 10-:     NORMAL LEFT VENTRICULAR CAVITY SIZE. MILD CONCENTRIC LEFT VENTRICULAR HYPERTROPHY. DYNAMIC  GLOBAL LEFT VENTRICULAR SYSTOLIC FUNCTION. EJECTION FRACTION IS 70%. MILD DIASTOLIC DYSFUNCTION. NORMAL CHAMBER SIZES. NO HEMODYNAMICALLY SIGNIFICANT VALVULAR PATHOLOGY. NORMAL PULMONARY ARTERY PRESSURE OF 22 MMHG. PFT PFTs: 1/11/18    FVC  1.35 (44 %)  FEV1 1.23 (55 %)  FEV1/FVC  92 %  TLC 62 %   %  DL/VA 31 %       Other Interpretation: Ambulatory oximetry Does show significant desaturation;starting O2 sat 90 %; low O2 sat 85 %;   starting HR 78 bpm; high HR 99 bpm  Dyspnea scale: beginning 0; ending 0  Total distance : 330 ft   Total time: 3 minutes    Recommendation: Ambulatory O2 was recommended. Imaging:  I have personally reviewed the patients radiographs and have reviewed the reports:              Andrez Singh M.D.   Pulmonary Critical Care & Sleep Medicine 22:19

## 2020-02-07 NOTE — CONSULT NOTE ADULT - ATTENDING COMMENTS
I have not examined patient but agree with note above. Management for headache per primary team. patient should follow up with [facility] ophthalmology within 1 day of discharge.
Patient seen and examined with housestaff on 2/8/20  met with ophthalmology resident  met with ED team  Imaging reviewed    Briefly, 53 yo RH woman with PMHx of HTN, CAD s/p stent on DAPT (most recent 3 weeks ago), MI (12/) presents with complaints of L. eye blurriness and modest pain on moving the eye since the AM of 2/7/20, accompanied by blurring in left eye only and conjunctival hemorrhage. Discomfort is reported as pressure in eye and – to a lesser degree – the left temple. She admits to more severe headaches and worse blurry vision in the past. Upon detailed review of possible MS-like symptoms, she also reports intermittent L lip and L upper arm numbness before current symptoms.    Patient currently denies fevers, chills, ns, cp, sob, abd pain, n/v/d/c, weakness, or changes to weight.     EXAMINATION   NO APD in either eye. No color desaturation.  AOx3. Speech is fluent and nonaphasic.  No drift, full strength, no Babinski, no spasticity.    IMAGING  MRI 2/7/20 – demonstrates innumerable small UBO’s, including a larger T2 hyperintensity involving the sensory strip on the right hemisphere and likely involving avalos’s fingers. There is no parenchymal enhancement, but there is enhancement of the left optic nerve that is not present on the right.    IMPRESSION/PLAN  MULTIPLE SCLEROSIS – The constellation of optic neuritis, enhancing left optic nerve, parenchymal white matter changes, and left lip numbness (fitting with the larger, U-fiber T2 hyperintensity on MRI) is most c/w MS. Would treat with solumedrol 250mg IVSS q6 for 3 days, then discharge home on a long taper of prednisone following such IVSS therapy as 1mg/kg/day (~60mg) for 11 days, followed by a 4 day taper ([id number]). About half of patients with optic neuritis eventually develop kaylee MS, but I think she already meets criteria for a diagnosis of MS.    An MRI of the total spine with contrast as well as a lumbar puncture, with CSF sent for oligoclonal bands (with serum), cell count, protein, glucose, and IgG synthesis is appropriate.      I recommend follow up with Dr. Armen Puri [phone] (multiple sclerosis expert), and ophthalmology as outpatient.    NMO – please send serum antibodies for Devic’s disease (aka neuromyelitis optica or NMO).    Total time spent was 45 minutes, of which half in coordination of care and counselling.

## 2020-02-07 NOTE — ED PROVIDER NOTE - CLINICAL SUMMARY MEDICAL DECISION MAKING FREE TEXT BOX
54F hx htn hld cad with stents arrives w/ complaint of itnermittent LUE numbness, now with cosntant left eye pressure, blurry vision and temporal pain on exam. concern for temporal arteritis given significant eye pressure with temporal pain and visual changes, unlikely stroke given intermittent symptoms without any focal findings on exam. possibly tension headache. plan for immediate hgih dose steroid given visual changes, emergent ophtho/neuro eval, CTA, inflam markers, reassess.

## 2020-02-07 NOTE — ED ADULT TRIAGE NOTE - CHIEF COMPLAINT QUOTE
Pt with co left arm numbness since yesterday . Pt reports left face numbness and noticed that her left eye has broken vessels. Pt b/p elevated pt took medications.

## 2020-02-07 NOTE — CONSULT NOTE ADULT - SUBJECTIVE AND OBJECTIVE BOX
Neurology  Consult Note  20    Name:  TRAMAINE TURPIN; 54y (1965)    Reason for Admission:     Chief Complaint:  L. eye blurry vision    HPI:  53yo  woman w/ a PMHx of HTN, CAD s/p stent on DAPT (most recent 3 weeks ago), MI () presents with complaints of L. eye blurriness. Patient states that she went to bed normal last night. Woke this morning w/ blurring of left eye only and retinal hemorrhage. Reports associated pressure in eye, worse w/ movement, and L. temporal headache, described as pressure (patient known to have similar headaches in the past, more severe, w/o blurry vision). Patient reports intermittent L lip and L upper arm numbness that was present prior to today. Blurry vision progressive, w/o obscuration, so patient came to ED.  In the ED, patient is hypertensive, labs grossly WNL, CTH/CTA shows mild-mod intracranial atherosclerosis but NAD to my eyes.   Patient currently denies fevers, chills, ns, cp, sob, abd pain, n/v/d/c, weakness, or changes to weight.     Review of Systems:  As states in HPI.    PMHx:   Hyperlipidemia  MI (myocardial infarction)  HTN (hypertension)    PFHx:   Family history of myocardial infarction (Mother)    PSuHx:   H/O:     Medications:  MEDICATIONS  (STANDING):    MEDICATIONS  (PRN):    Vitals:  T(C): 36.7 (20 @ 17:46), Max: 36.8 (20 @ 15:03)  HR: 58 (20 @ 17:46) (58 - 66)  BP: 133/81 (20 @ 17:46) (133/81 - 181/84)  RR: 17 (20 @ 17:46) (17 - 18)  SpO2: 100% (20 @ 17:46) (100% - 100%)    Labs:                        11.6   3.64  )-----------( 240      ( 2020 16:40 )             36.3         136  |  98  |  11  ----------------------------<  90  3.6   |  27  |  0.59    Ca    10.2      2020 16:40    TPro  8.3  /  Alb  4.6  /  TBili  0.2  /  DBili  x   /  AST  23  /  ALT  27  /  AlkPhos  62  02-07    CAPILLARY BLOOD GLUCOSE        LIVER FUNCTIONS - ( 2020 16:40 )  Alb: 4.6 g/dL / Pro: 8.3 g/dL / ALK PHOS: 62 u/L / ALT: 27 u/L / AST: 23 u/L / GGT: x           PT/INR - ( 2020 16:40 )   PT: 11.8 SEC;   INR: 1.03     PTT - ( 2020 16:40 )  PTT:31.5 SEC    PHYSICAL EXAMINATION:  General: Well-developed, well nourished, in no acute distress.  Eyes: Conjunctiva and sclera clear. Pain on vertical movement only. Subconjunctival hemorrhage in inferior-temporal quadrant.   Neurologic:  - Mental Status:  Alert, awake, oriented to person, place, and time; Speech is fluent with intact naming, repetition, and comprehension; Good overall fund of knowledge  - Cranial Nerves II-XII:    II:  Visual fields are full to confrontation; Pupils are equal, round, and reactive to light. Subjective blurry vision L. eye. Pain on vertical movement only.   III, IV, VI:  Extraocular movements are intact without nystagmus.  V:  Facial sensation is intact in the V1-V3 distribution bilaterally.  VII:  Face is symmetric with normal eye closure and smile  VIII:  Hearing is intact to finger rub.  IX, X:  Uvula is midline and soft palate rises symmetrically  XI:  Head turning and shoulder shrug are intact.  XII:  Tongue protudes in the midline.  - Motor:  Strength is 5/5 throughout.  There is no pronator drift.  Normal muscle bulk and tone throughout.  - Reflexes:  2+ and symmetric at the biceps, triceps, brachioradialis, knees, and ankles.  Plantar responses flexor.  - Sensory:  Intact throughout to vibration, and joint-position, light touch, pin prick.  - Coordination:  Finger-nose-finger and heel-knee-shin intact without dysmetria.  Rapid alternating hand movements intact.    Radiology:  CTH/CTA show NAD to my eyes. Official read pending.   CTA shows mild-mod intracranial atherosclerosis.

## 2020-02-08 DIAGNOSIS — H46.9 UNSPECIFIED OPTIC NEURITIS: ICD-10-CM

## 2020-02-08 DIAGNOSIS — Z86.39 PERSONAL HISTORY OF OTHER ENDOCRINE, NUTRITIONAL AND METABOLIC DISEASE: Chronic | ICD-10-CM

## 2020-02-08 DIAGNOSIS — I25.10 ATHEROSCLEROTIC HEART DISEASE OF NATIVE CORONARY ARTERY WITHOUT ANGINA PECTORIS: ICD-10-CM

## 2020-02-08 DIAGNOSIS — I10 ESSENTIAL (PRIMARY) HYPERTENSION: ICD-10-CM

## 2020-02-08 DIAGNOSIS — Z29.9 ENCOUNTER FOR PROPHYLACTIC MEASURES, UNSPECIFIED: ICD-10-CM

## 2020-02-08 DIAGNOSIS — Z02.9 ENCOUNTER FOR ADMINISTRATIVE EXAMINATIONS, UNSPECIFIED: ICD-10-CM

## 2020-02-08 PROCEDURE — 99223 1ST HOSP IP/OBS HIGH 75: CPT | Mod: GC

## 2020-02-08 PROCEDURE — 99255 IP/OBS CONSLTJ NEW/EST HI 80: CPT

## 2020-02-08 RX ORDER — ASPIRIN/CALCIUM CARB/MAGNESIUM 324 MG
81 TABLET ORAL DAILY
Refills: 0 | Status: DISCONTINUED | OUTPATIENT
Start: 2020-02-08 | End: 2020-02-10

## 2020-02-08 RX ORDER — TICAGRELOR 90 MG/1
90 TABLET ORAL
Refills: 0 | Status: DISCONTINUED | OUTPATIENT
Start: 2020-02-07 | End: 2020-02-10

## 2020-02-08 RX ORDER — METOPROLOL TARTRATE 50 MG
25 TABLET ORAL
Refills: 0 | Status: DISCONTINUED | OUTPATIENT
Start: 2020-02-08 | End: 2020-02-10

## 2020-02-08 RX ORDER — DEXAMETHASONE 0.5 MG/5ML
250 ELIXIR ORAL EVERY 6 HOURS
Refills: 0 | Status: DISCONTINUED | OUTPATIENT
Start: 2020-02-08 | End: 2020-02-08

## 2020-02-08 RX ORDER — ATORVASTATIN CALCIUM 80 MG/1
80 TABLET, FILM COATED ORAL AT BEDTIME
Refills: 0 | Status: DISCONTINUED | OUTPATIENT
Start: 2020-02-08 | End: 2020-02-10

## 2020-02-08 RX ADMIN — ATORVASTATIN CALCIUM 80 MILLIGRAM(S): 80 TABLET, FILM COATED ORAL at 23:17

## 2020-02-08 RX ADMIN — Medication 25 MILLIGRAM(S): at 06:51

## 2020-02-08 RX ADMIN — Medication 81 MILLIGRAM(S): at 12:39

## 2020-02-08 RX ADMIN — TICAGRELOR 90 MILLIGRAM(S): 90 TABLET ORAL at 18:06

## 2020-02-08 RX ADMIN — Medication 104 MILLIGRAM(S): at 18:05

## 2020-02-08 RX ADMIN — TICAGRELOR 90 MILLIGRAM(S): 90 TABLET ORAL at 06:48

## 2020-02-08 RX ADMIN — Medication 25 MILLIGRAM(S): at 18:06

## 2020-02-08 NOTE — H&P ADULT - PROBLEM SELECTOR PLAN 3
-c/w metoprolol;   -Triamterene-HCTZ held as patient's BP was lower than usual; resume as needed   -isosorbide recently discontinued by cardiologist as per patient

## 2020-02-08 NOTE — H&P ADULT - NSHPREVIEWOFSYSTEMS_GEN_ALL_CORE
55yo  woman w/ a PMHx of HTN, CAD s/p stent on DAPT (most recent 3 weeks ago), MI (12/) presents with complaints of L. eye blurriness. Patient states that she went to bed normal last night. Woke this morning w/ blurring of left eye only and retinal hemorrhage. Reports associated pressure in eye, worse w/ movement, and L. temporal headache, described as pressure (patient known to have similar headaches in the past, more severe, w/o blurry vision). Patient reports intermittent L lip and L upper arm numbness that was present prior to today. Blurry vision progressive, w/o obscuration, so patient came to ED.  In the ED, patient is hypertensive, labs grossly WNL, CTH/CTA shows mild-mod intracranial atherosclerosis but NAD to my eyes.   Patient currently denies fevers, chills, ns, cp, sob, abd pain, n/v/d/c, weakness, or changes to weight. Constitutional: denies fevers, chills, night sweats, weight loss  HEENT: denies hearing changes, rhinitis, odynophagia, or dysphagia; +ve visual changes (improving), +eye redness  Cardiovascular: denies palpitations, chest pain, edema  Respiratory: denies SOB, wheezing  Gastrointestinal: denies N/V/D, abdominal pain, hematochezia, melena  : denies dysuria, hematuria  MSK: denies weakness, joint pain  Neuro: no numbness or tingling  Psych: no depression or anxiety  Skin: denies new rashes or masses

## 2020-02-08 NOTE — PROGRESS NOTE ADULT - SUBJECTIVE AND OBJECTIVE BOX
[facility] DEPARTMENT OF OPHTHALMOLOGY  --------------------------------------------------------------------  Faraz Henry MD PGY-2  Pager: [phone]/[facility]: [id number]  --------------------------------------------------------------------    Interval History: No acute events overnight. Reports eye feels better today, decreased in retrobulbar pain. Denies any changes in vision. Only has pain w/ in extremes of EOM.     MEDICATIONS  (STANDING):  aspirin  chewable 81 milliGRAM(s) Oral daily  atorvastatin 80 milliGRAM(s) Oral at bedtime  methylPREDNISolone sodium succinate IVPB 250 milliGRAM(s) IV Intermittent every 6 hours  metoprolol tartrate 25 milliGRAM(s) Oral two times a day  ticagrelor 90 milliGRAM(s) Oral two times a day    MEDICATIONS  (PRN):      VITALS: T(C): 36.8 (02-08-20 @ 15:20)  T(F): 98.2 (02-08-20 @ 15:20), Max: 98.7 (02-07-20 @ 22:22)  HR: 69 (02-08-20 @ 15:20) (58 - 70)  BP: 105/64 (02-08-20 @ 15:20) (105/64 - 133/81)  RR:  (17 - 18)  SpO2:  (100% - 100%)  Wt(kg): --  General: AAO x 3, appropriate mood and affect    Ophthalmology Exam:  Visual acuity (sc): 20/20 OU  Pupils: PERRL OU, no APD  Ttono: 15 OD 14 OS  Extraocular movements (EOMs): Full OU, no pain, no diplopia  Confrontational Visual Field (CVF): Full OU  Color: Full OU    Pen Light Exam (PLE)  External: Flat OU  Lids/Lashes/Lacrimal Ducts: Flat OU    Sclera/Conjunctiva: W+Q OD, BORIS from 2-3 o'clock OS.   Cornea: Cl OU  Anterior Chamber: D+F OU    Iris: Flat OU  Lens: Cl OU    Imaging:   MR Orbits w/wo IV Cont (02.07.20 @ 23:52) >  EXAM:  MR ORBITS ONLY [facility]    EXAM:  MR BRAIN WAW IC      PROCEDURE DATE:  Feb 7 2020     INTERPRETATION:  Clinical indication: Visual changes left eye.  MRI of the orbits were performed using axial and coronal T1 and T2-weighted sequence (with fat suppression). Sagittal T1, axial T1 T2 T2 FLAIR diffusion and susceptibility weighted sequence was performed through the brain. The patient was injected with approximately 7.5 cc Gadavist IV and sagittal coronal and axial T1-weighted sequences were performed through the orbits. Sagittal coronal and axial T1-weighted sequences performed through the brain.  This exam is compared with prior head CT and CT angiogram of the neck and brain performed on February 7, 2020.  Both globes appear normal symmetric.  There is subtle enhancement seen involving the intraorbital segment of the left optic nerve when compared with the right side. This is best seen on series 13 image 29 through 31. This finding could be compatible with optic neuritis. Please correlate clinically.  Evaluation of the rest of the intraorbital soft tissue structures appear normal.  Evaluation of the optic chiasm appears unremarkable  Parenchymal volume loss is identified  Abnormal T2 prolongation is seenin periventricular white matter region. This finding is nonspecific and has been associated with migraine headaches though the possibility of demyelinating disease, infection inflammation or ischemia cannot because excluded. Clinical correlation is recommended.  Cavum vellum interpositum is seen which is likely no clinical significance  There is no evidence acute hemorrhage mass mass effect or abnormal enhancement seen  Evaluation of the diffusion weighted sequence demonstrates no abnormal areas of restricted diffusion to suggest acute infarct.  The large vessels demonstrate normal flow voids  Left maxillary bilateral ethmoid sinus mucosal thickening is seen. Both mastoid and middle ear regions appear clear.    Impression: Subtle enhancement is seen involving the intraorbital segment left optic nerve which is suspicious for optic neuritis.  Abnormal T2 prolongation in periventricular white matter region is seen. This could be compatible with demyelinating disease though the possibility of underlying infectious inflammatory or ischemic process cannot be entirely excluded. Clinical correlation is recommended.    Assessment and Recommendations:  54y female w/ h/o cardiac disease, here with eye redness and pain. On exam, BCVA 20/20 OU, no APD, color full OU, EOM intact and only has pain w/ in extreme EOM, CVF full OU, IOP wnl OU. Pen light exam w/ BORIS 2-3 o'clock OS, otherwise wnl. Posterior exam wnl. ESR 20 and CRP < 4. MRI brain/orbits performed and there are areas of T2 prolongation and subtle enhancement seen in the intraorbital segment of the left optic nerve suspicious for optic neuritis.   - case D/W Dr. Khan (neuro-oph)  - possible that patient has subclinical optic neuritis w/o objective clinical findings at this time  - mgmt of optic neuritis per neurology  - patient would benefit from outpatient visual field testing    Outpatient follow-up: Patient should follow-up with his/her ophthalmologist or with [facility] Department of Ophthalmology after discharge at:    [address]  [place] [address]  [phone]    Faraz Henry MD, PGY-2  Pager: [phone]/[facility]: [id number]

## 2020-02-08 NOTE — H&P ADULT - NSHPLABSRESULTS_GEN_ALL_CORE
11.6   3.64  )-----------( 240      ( 07 Feb 2020 16:40 )             36.3       02-07    136  |  98  |  11  ----------------------------<  90  3.6   |  27  |  0.59    Ca    10.2      07 Feb 2020 16:40    TPro  8.3  /  Alb  4.6  /  TBili  0.2  /  DBili  x   /  AST  23  /  ALT  27  /  AlkPhos  62  02-07      CAPILLARY BLOOD GLUCOSE      POCT Blood Glucose.: 104 mg/dL (08 Feb 2020 15:34)  POCT Blood Glucose.: 136 mg/dL (08 Feb 2020 09:17)          PT/INR - ( 07 Feb 2020 16:40 )   PT: 11.8 SEC;   INR: 1.03          PTT - ( 07 Feb 2020 16:40 )  PTT:31.5 SEC    Radiology

## 2020-02-08 NOTE — H&P ADULT - ASSESSMENT
54 year old female with history of HTN, CAD and MI (12/9) s/p stents (2 stents on 12/13 and 1 stent on 1/7), who presented to the ED on 2/7 with left eye blurriness, with MRI brain and orbits concerning for optic neuritis and neuritis. 54 year old female with history of HTN, CAD and MI (12/9) s/p stents (2 stents on 12/13 and 1 stent on 1/7), who presented to the ED on 2/7 with left eye blurriness, with MRI brain and orbits concerning for optic neuritis and MS.

## 2020-02-08 NOTE — H&P ADULT - HISTORY OF PRESENT ILLNESS
54 year old female with history of HTN, CAD s/p stents (3 weeks ago, currently on DAPT), MI (12/1) who presented to the ED on 2/7 with left eye blurriness. Patient stated she was in her usual state of health until the morning of presentation. Upon awakening in the morning she noted that she redness and blurry vision in her left eye. She also described having eye pressure that was worsened with eye movement, and note having a left sided headache. She also reported experiencing intermittent upper arm numbness, that started a few days prior to her ocular symptoms. She denied fevers, chills, CP, SOB, abdominal pain, nausea, vomiting, diarrhea, constipation. No extremity weakness or issues with ambulation.    In the ED patient was found to be hypertensive 54 year old female with history of HTN, CAD s/p stents (3 weeks ago, currently on DAPT), MI (12/1) who presented to the ED on 2/7 with left eye blurriness. Patient stated she was in her usual state of health until the morning of presentation. Upon awakening in the morning she noted that she redness and blurry vision in her left eye. She also described having eye pressure that was worsened with eye movement, and note having a left sided headache. She also reported experiencing intermittent upper arm numbness, that started a few days prior to her ocular symptoms. She denied fevers, chills, CP, SOB, abdominal pain, nausea, vomiting, diarrhea, constipation. No extremity weakness or issues with ambulation.    In the ED patient was found to be hypertensive, labs WNL, CTA head and neck without acute findings. Patient was sent to CDU  and subsequently further evaluated by neuro and ophtho 54 year old female with history of HTN, CAD and MI (12/9) s/p stents (2 stents on 12/13 and 1 stent on 1/7), who presented to the ED on 2/7 with left eye blurriness. Patient stated she was in her usual state of health until the morning of presentation. Upon awakening in the morning she noted that she redness and blurry vision in her left eye. She also described having eye pressure that was worsened with eye movement, and note having a left sided headache. She also reported experiencing intermittent upper arm numbness, that started a few days prior to her ocular symptoms. She denied fevers, chills, CP, SOB, abdominal pain, nausea, vomiting, diarrhea, constipation. No extremity weakness or issues with ambulation.    In the ED patient was found to be hypertensive, labs WNL, CTA head and neck without acute findings. Patient was sent to CDU  and subsequently further evaluated by neuro and ophtho. At the time of admission to the hospital her vision was improving and her eye pressure had resolved.

## 2020-02-08 NOTE — ED CDU PROVIDER DISPOSITION NOTE - CLINICAL COURSE
54 y.o female with PMHx of CAD with PCI (brilinta and ASA), HTN, HLD, DM, who presents to ED c/o Lt eye pressure, blurry vision and Lt arm numbness. Pt seen and worked up in ED, CTA's negative, Labs wnl. Pt seen by Ophtho and cleared from their standpoint. Neuro consulted recommend MR's which were remarkable for left optic neuritis, suspicious for MS and advised high dose steroids. Rec admission. Pt otherwise stable

## 2020-02-08 NOTE — H&P ADULT - NSICDXPASTMEDICALHX_GEN_ALL_CORE_FT
PAST MEDICAL HISTORY:  CAD (coronary artery disease)     HTN (hypertension)     Hyperlipidemia     MI (myocardial infarction)

## 2020-02-08 NOTE — ED CDU PROVIDER SUBSEQUENT DAY NOTE - PHYSICAL EXAMINATION
Vital signs reviewed.   CONSTITUTIONAL: Well-appearing; well-nourished; in no apparent distress. Non-toxic appearing.   HEAD: Normocephalic, atraumatic.  EYES: PERRL, EOM intact, conjunctiva and sclera WNL. +Lt subconjunctival hemorrhage.   ENT: normal nose; no rhinorrhea;   CARD: Normal S1, S2; no murmurs, rubs, or gallops noted.  RESP: Normal chest excursion with respiration; breath sounds clear and equal bilaterally; no wheezes, rhonchi, or rales.  EXT/MS: moves all extremities; no pedal edema.  SKIN: Normal for age and race; warm; dry; good turgor; no apparent lesions or exudate noted.  NEURO: Awake, alert, oriented x 3, no gross deficits, no motor or sensory deficit noted. No facial droop. No slurred speech. Normal finger to nose. No drift.   PSYCH: Normal mood; appropriate affect.

## 2020-02-08 NOTE — H&P ADULT - PROBLEM SELECTOR PLAN 1
Pt with acute visual changes, eye pain and redness MRI consistent with optic neuritis  MRI brain with abnml T2 prolongation in periventricular white matter compatible for demyelinating disease- given patient also had L arm numbness, there is concern for MS. However we still have to consider the possibility of an ischemic, inflammatory or infectious process  -ophtho consulted; no acute intervention, rec outpt neuro-ophtho follow up   -neurology consulted, recommended high dose steroids, with taper as below  ----250mg q6 x 3 days (end 2/10), then 60mg x 11 days, followed by 4 day taper 36-02-45-10-0  -Also rec MRI total spine with contrast  -Pt will also needs lumbar puncture/ oligoclonal studies/IgG synthesis  -F/U neuro recs

## 2020-02-08 NOTE — H&P ADULT - NSHPPHYSICALEXAM_GEN_ALL_CORE
Vital Signs Last 24 Hrs  T(C): 36.8 (08 Feb 2020 16:59), Max: 37.1 (07 Feb 2020 22:22)  T(F): 98.3 (08 Feb 2020 16:59), Max: 98.7 (07 Feb 2020 22:22)  HR: 73 (08 Feb 2020 16:59) (60 - 73)  BP: 136/89 (08 Feb 2020 16:59) (105/64 - 136/89)  BP(mean): --  RR: 18 (08 Feb 2020 16:59) (17 - 18)  SpO2: 100% (08 Feb 2020 16:59) (100% - 100%)    GENERAL: NAD, well-developed  HEAD: Atraumatic, Normocephalic  EYES: EOMI, PERRLA, +conjunctival hemorrhage in lateral portio of left eye   NECK: Supple, No JVD  CHEST/LUNG: Clear to auscultation bilaterally; No wheezes/rales/rhonchi  HEART: Regular rate and rhythm; No murmurs, rubs, or gallops  ABDOMEN: Soft, Nontender, Nondistended; Bowel sounds present  EXTREMITIES:  2+ dP pulses b/l, No clubbing, cyanosis, or edema  PSYCH: reactive affect  NEUROLOGY: AAOx3, non-focal  SKIN: No rashes or lesions

## 2020-02-08 NOTE — ED CDU PROVIDER SUBSEQUENT DAY NOTE - PROGRESS NOTE DETAILS
Patient resting comfortably, no complaints at this time. No acute events overnight. Pending MR results and neuro re-eval. Will monitor closely.

## 2020-02-08 NOTE — H&P ADULT - ATTENDING COMMENTS
55 yo F with history of HTN, CAD and MI (12/9) s/p stents (2 stents on 12/13 and 1 stent on 1/7), who presented to the ED on 2/7 with left blurry vision and LT eye pain with LT upper lip and LT upper arm numbness and tingling. PE Lt eye subconjunctival hemm vision grossly intact. MRI brain and orbits concerning for optic neuritis and MS.   Optic neuritis- likely MS                      -steroid as per neuro                      -LP as per neuro currently on brillinta/ASA given recent stents will defer to cardiology (Hugo) in                      regards antiplatelet drug holiday in setting of recent stents and will defer to neurology in regards                      to urgency of LP for diagnosis and on going management.

## 2020-02-08 NOTE — H&P ADULT - NSICDXFAMILYHX_GEN_ALL_CORE_FT
FAMILY HISTORY:  Family history of myocardial infarction, passed away at age 62  FH: HTN (hypertension)

## 2020-02-09 LAB
ANION GAP SERPL CALC-SCNC: 11 MMO/L — SIGNIFICANT CHANGE UP (ref 7–14)
APTT BLD: 30.4 SEC — SIGNIFICANT CHANGE UP (ref 27.5–36.3)
BASOPHILS # BLD AUTO: 0 K/UL — SIGNIFICANT CHANGE UP (ref 0–0.2)
BASOPHILS NFR BLD AUTO: 0 % — SIGNIFICANT CHANGE UP (ref 0–2)
BASOPHILS NFR SPEC: 0 % — SIGNIFICANT CHANGE UP (ref 0–2)
BLASTS # FLD: 0 % — SIGNIFICANT CHANGE UP (ref 0–0)
BUN SERPL-MCNC: 11 MG/DL — SIGNIFICANT CHANGE UP (ref 7–23)
BURR CELLS BLD QL SMEAR: PRESENT — SIGNIFICANT CHANGE UP
CALCIUM SERPL-MCNC: 9.8 MG/DL — SIGNIFICANT CHANGE UP (ref 8.4–10.5)
CHLORIDE SERPL-SCNC: 104 MMOL/L — SIGNIFICANT CHANGE UP (ref 98–107)
CO2 SERPL-SCNC: 22 MMOL/L — SIGNIFICANT CHANGE UP (ref 22–31)
CREAT SERPL-MCNC: 0.43 MG/DL — LOW (ref 0.5–1.3)
EOSINOPHIL # BLD AUTO: 0 K/UL — SIGNIFICANT CHANGE UP (ref 0–0.5)
EOSINOPHIL NFR BLD AUTO: 0 % — SIGNIFICANT CHANGE UP (ref 0–6)
EOSINOPHIL NFR FLD: 0 % — SIGNIFICANT CHANGE UP (ref 0–6)
GIANT PLATELETS BLD QL SMEAR: PRESENT — SIGNIFICANT CHANGE UP
GLUCOSE SERPL-MCNC: 151 MG/DL — HIGH (ref 70–99)
HBA1C BLD-MCNC: 5.6 % — SIGNIFICANT CHANGE UP (ref 4–5.6)
HCT VFR BLD CALC: 37.5 % — SIGNIFICANT CHANGE UP (ref 34.5–45)
HGB BLD-MCNC: 11.5 G/DL — SIGNIFICANT CHANGE UP (ref 11.5–15.5)
IMM GRANULOCYTES NFR BLD AUTO: 0.4 % — SIGNIFICANT CHANGE UP (ref 0–1.5)
INR BLD: 1.02 — SIGNIFICANT CHANGE UP (ref 0.88–1.17)
LYMPHOCYTES # BLD AUTO: 0.94 K/UL — LOW (ref 1–3.3)
LYMPHOCYTES # BLD AUTO: 33 % — SIGNIFICANT CHANGE UP (ref 13–44)
LYMPHOCYTES NFR SPEC AUTO: 20 % — SIGNIFICANT CHANGE UP (ref 13–44)
MAGNESIUM SERPL-MCNC: 2.2 MG/DL — SIGNIFICANT CHANGE UP (ref 1.6–2.6)
MCHC RBC-ENTMCNC: 26 PG — LOW (ref 27–34)
MCHC RBC-ENTMCNC: 30.7 % — LOW (ref 32–36)
MCV RBC AUTO: 84.7 FL — SIGNIFICANT CHANGE UP (ref 80–100)
METAMYELOCYTES # FLD: 0 % — SIGNIFICANT CHANGE UP (ref 0–1)
MONOCYTES # BLD AUTO: 0.06 K/UL — SIGNIFICANT CHANGE UP (ref 0–0.9)
MONOCYTES NFR BLD AUTO: 2.1 % — SIGNIFICANT CHANGE UP (ref 2–14)
MONOCYTES NFR BLD: 0.9 % — LOW (ref 2–9)
MYELOCYTES NFR BLD: 0 % — SIGNIFICANT CHANGE UP (ref 0–0)
NEUTROPHIL AB SER-ACNC: 74.8 % — SIGNIFICANT CHANGE UP (ref 43–77)
NEUTROPHILS # BLD AUTO: 1.84 K/UL — SIGNIFICANT CHANGE UP (ref 1.8–7.4)
NEUTROPHILS NFR BLD AUTO: 64.5 % — SIGNIFICANT CHANGE UP (ref 43–77)
NEUTS BAND # BLD: 0 % — SIGNIFICANT CHANGE UP (ref 0–6)
NRBC # FLD: 0 K/UL — SIGNIFICANT CHANGE UP (ref 0–0)
OTHER - HEMATOLOGY %: 0 — SIGNIFICANT CHANGE UP
OVALOCYTES BLD QL SMEAR: SLIGHT — SIGNIFICANT CHANGE UP
PHOSPHATE SERPL-MCNC: 2.3 MG/DL — LOW (ref 2.5–4.5)
PLATELET # BLD AUTO: 270 K/UL — SIGNIFICANT CHANGE UP (ref 150–400)
PLATELET COUNT - ESTIMATE: NORMAL — SIGNIFICANT CHANGE UP
PMV BLD: 10.2 FL — SIGNIFICANT CHANGE UP (ref 7–13)
POIKILOCYTOSIS BLD QL AUTO: SLIGHT — SIGNIFICANT CHANGE UP
POTASSIUM SERPL-MCNC: 3.8 MMOL/L — SIGNIFICANT CHANGE UP (ref 3.5–5.3)
POTASSIUM SERPL-SCNC: 3.8 MMOL/L — SIGNIFICANT CHANGE UP (ref 3.5–5.3)
PROMYELOCYTES # FLD: 0 % — SIGNIFICANT CHANGE UP (ref 0–0)
PROTHROM AB SERPL-ACNC: 11.3 SEC — SIGNIFICANT CHANGE UP (ref 9.8–13.1)
RBC # BLD: 4.43 M/UL — SIGNIFICANT CHANGE UP (ref 3.8–5.2)
RBC # FLD: 13.3 % — SIGNIFICANT CHANGE UP (ref 10.3–14.5)
SMUDGE CELLS # BLD: PRESENT — SIGNIFICANT CHANGE UP
SODIUM SERPL-SCNC: 137 MMOL/L — SIGNIFICANT CHANGE UP (ref 135–145)
VARIANT LYMPHS # BLD: 4.3 % — SIGNIFICANT CHANGE UP
WBC # BLD: 2.85 K/UL — LOW (ref 3.8–10.5)
WBC # FLD AUTO: 2.85 K/UL — LOW (ref 3.8–10.5)

## 2020-02-09 PROCEDURE — 72142 MRI NECK SPINE W/DYE: CPT | Mod: 26

## 2020-02-09 PROCEDURE — 72147 MRI CHEST SPINE W/DYE: CPT | Mod: 26

## 2020-02-09 PROCEDURE — 99233 SBSQ HOSP IP/OBS HIGH 50: CPT

## 2020-02-09 PROCEDURE — 72149 MRI LUMBAR SPINE W/DYE: CPT | Mod: 26

## 2020-02-09 RX ORDER — SODIUM,POTASSIUM PHOSPHATES 278-250MG
1 POWDER IN PACKET (EA) ORAL
Refills: 0 | Status: COMPLETED | OUTPATIENT
Start: 2020-02-09 | End: 2020-02-10

## 2020-02-09 RX ADMIN — Medication 1 PACKET(S): at 18:24

## 2020-02-09 RX ADMIN — Medication 81 MILLIGRAM(S): at 12:31

## 2020-02-09 RX ADMIN — ATORVASTATIN CALCIUM 80 MILLIGRAM(S): 80 TABLET, FILM COATED ORAL at 21:53

## 2020-02-09 RX ADMIN — Medication 104 MILLIGRAM(S): at 00:22

## 2020-02-09 RX ADMIN — Medication 25 MILLIGRAM(S): at 18:24

## 2020-02-09 RX ADMIN — Medication 104 MILLIGRAM(S): at 13:03

## 2020-02-09 RX ADMIN — Medication 25 MILLIGRAM(S): at 06:43

## 2020-02-09 RX ADMIN — Medication 104 MILLIGRAM(S): at 06:42

## 2020-02-09 RX ADMIN — TICAGRELOR 90 MILLIGRAM(S): 90 TABLET ORAL at 06:43

## 2020-02-09 RX ADMIN — TICAGRELOR 90 MILLIGRAM(S): 90 TABLET ORAL at 18:24

## 2020-02-09 RX ADMIN — Medication 104 MILLIGRAM(S): at 18:23

## 2020-02-09 NOTE — PROGRESS NOTE ADULT - PROBLEM SELECTOR PLAN 1
Pt with acute visual changes, eye pain and redness MRI consistent with optic neuritis  MRI brain with abnml T2 prolongation in periventricular white matter compatible for demyelinating disease- given patient also had L arm numbness, there is concern for MS. However we still have to consider the possibility of an ischemic, inflammatory or infectious process  -ophtho consulted; no acute intervention, rec outpt neuro-ophtho follow up   -neurology consulted, recommended high dose steroids, with taper as below  ----250mg q6 x 3 days (end 2/10), then 60mg x 11 days, followed by 4 day taper 32-52-07-10-0  -pending MRI total spine with contrast  -Pt will also needs lumbar puncture/ oligoclonal studies/IgG synthesis  -F/U neuro recs Pt with acute visual changes, eye pain and redness MRI consistent with optic neuritis  MRI brain with abnml T2 prolongation in periventricular white matter compatible for demyelinating disease- given patient also had L arm numbness, there is concern for MS. However we still have to consider the possibility of an ischemic, inflammatory or infectious process  -ophtho consulted; no acute intervention, rec outpt neuro-ophtho follow up   -neurology consulted, recommended high dose steroids, with taper as below  ----250mg q6 x 3 days (end 2/10), then 60mg x 11 days, followed by 4 day taper 90-26-78-10-0  -pending MRI total spine with contrast  -Requires lumbar puncture/ oligoclonal studies/IgG synthesis, to consult procedure team tomorrow AM  -F/U neuro recs

## 2020-02-09 NOTE — PROGRESS NOTE ADULT - ASSESSMENT
54 year old female with history of HTN, CAD and MI (12/9) s/p stents (2 stents on 12/13 and 1 stent on 1/7), who presented to the ED on 2/7 with left eye blurriness, with MRI brain and orbits concerning for optic neuritis and MS.

## 2020-02-09 NOTE — PROGRESS NOTE ADULT - SUBJECTIVE AND OBJECTIVE BOX
Interval Events: No acute overnight events.       OBJECTIVE:  ICU Vital Signs Last 24 Hrs  T(C): 36.3 (09 Feb 2020 06:38), Max: 36.8 (08 Feb 2020 10:58)  T(F): 97.4 (09 Feb 2020 06:38), Max: 98.3 (08 Feb 2020 16:59)  HR: 63 (09 Feb 2020 06:38) (60 - 73)  BP: 112/85 (09 Feb 2020 06:38) (105/64 - 136/89)  BP(mean): --  ABP: --  ABP(mean): --  RR: 18 (09 Feb 2020 06:38) (18 - 18)  SpO2: 100% (09 Feb 2020 06:38) (99% - 100%)        02-08 @ 07:01  -  02-09 @ 07:00  --------------------------------------------------------  IN: 650 mL / OUT: 2 mL / NET: 648 mL      CAPILLARY BLOOD GLUCOSE      POCT Blood Glucose.: 104 mg/dL (08 Feb 2020 15:34)      PHYSICAL EXAM:  	GENERAL: NAD, well-developed  	HEAD: Atraumatic, Normocephalic  	EYES: EOMI, PERRLA, +conjunctival hemorrhage in lateral portio of left eye   	NECK: Supple, No JVD  	CHEST/LUNG: Clear to auscultation bilaterally; No wheezes/rales/rhonchi  	HEART: Regular rate and rhythm; No murmurs, rubs, or gallops  	ABDOMEN: Soft, Nontender, Nondistended; Bowel sounds present  	EXTREMITIES:  2+ dP pulses b/l, No clubbing, cyanosis, or edema  	PSYCH: reactive affect  	NEUROLOGY: AAOx3, non-focal  SKIN: No rashes or lesions    HOSPITAL MEDICATIONS:  aspirin  chewable 81 milliGRAM(s) Oral daily  ticagrelor 90 milliGRAM(s) Oral two times a day  metoprolol tartrate 25 milliGRAM(s) Oral two times a day  atorvastatin 80 milliGRAM(s) Oral at bedtime  methylPREDNISolone sodium succinate IVPB 250 milliGRAM(s) IV Intermittent every 6 hours          LABS:                        11.6   3.64  )-----------( 240      ( 07 Feb 2020 16:40 )             36.3     Hgb Trend: 11.6<--  02-07    136  |  98  |  11  ----------------------------<  90  3.6   |  27  |  0.59    Ca    10.2      07 Feb 2020 16:40    TPro  8.3  /  Alb  4.6  /  TBili  0.2  /  DBili  x   /  AST  23  /  ALT  27  /  AlkPhos  62  02-07    Creatinine Trend: 0.59<--, 0.58<--, 0.59<--  PT/INR - ( 07 Feb 2020 16:40 )   PT: 11.8 SEC;   INR: 1.03          PTT - ( 07 Feb 2020 16:40 )  PTT:31.5 SEC Interval Events: No acute overnight events. Pt says the blurriness and eye pressure have resolved, reports headache in frontal and temporal areas that resolved with tylenol. Denies f/c/cp/sob/n/v.       OBJECTIVE:  ICU Vital Signs Last 24 Hrs  T(C): 36.3 (09 Feb 2020 06:38), Max: 36.8 (08 Feb 2020 10:58)  T(F): 97.4 (09 Feb 2020 06:38), Max: 98.3 (08 Feb 2020 16:59)  HR: 63 (09 Feb 2020 06:38) (60 - 73)  BP: 112/85 (09 Feb 2020 06:38) (105/64 - 136/89)  BP(mean): --  ABP: --  ABP(mean): --  RR: 18 (09 Feb 2020 06:38) (18 - 18)  SpO2: 100% (09 Feb 2020 06:38) (99% - 100%)        02-08 @ 07:01  -  02-09 @ 07:00  --------------------------------------------------------  IN: 650 mL / OUT: 2 mL / NET: 648 mL      CAPILLARY BLOOD GLUCOSE      POCT Blood Glucose.: 104 mg/dL (08 Feb 2020 15:34)      PHYSICAL EXAM:  	GENERAL: NAD, well-developed  	HEAD: Atraumatic, Normocephalic  	EYES: EOMI, PERRLA, +conjunctival hemorrhage in lateral portio of left eye   	NECK: Supple, No JVD  	CHEST/LUNG: Clear to auscultation bilaterally; No wheezes/rales/rhonchi  	HEART: Regular rate and rhythm; No murmurs, rubs, or gallops  	ABDOMEN: Soft, Nontender, Nondistended; Bowel sounds present  	EXTREMITIES:  2+ dP pulses b/l, No clubbing, cyanosis, or edema  	PSYCH: reactive affect  	NEUROLOGY: AAOx3, non-focal  SKIN: No rashes or lesions    HOSPITAL MEDICATIONS:  aspirin  chewable 81 milliGRAM(s) Oral daily  ticagrelor 90 milliGRAM(s) Oral two times a day  metoprolol tartrate 25 milliGRAM(s) Oral two times a day  atorvastatin 80 milliGRAM(s) Oral at bedtime  methylPREDNISolone sodium succinate IVPB 250 milliGRAM(s) IV Intermittent every 6 hours          LABS:                        11.6   3.64  )-----------( 240      ( 07 Feb 2020 16:40 )             36.3     Hgb Trend: 11.6<--  02-07    136  |  98  |  11  ----------------------------<  90  3.6   |  27  |  0.59    Ca    10.2      07 Feb 2020 16:40    TPro  8.3  /  Alb  4.6  /  TBili  0.2  /  DBili  x   /  AST  23  /  ALT  27  /  AlkPhos  62  02-07    Creatinine Trend: 0.59<--, 0.58<--, 0.59<--  PT/INR - ( 07 Feb 2020 16:40 )   PT: 11.8 SEC;   INR: 1.03          PTT - ( 07 Feb 2020 16:40 )  PTT:31.5 SEC

## 2020-02-10 ENCOUNTER — TRANSCRIPTION ENCOUNTER (OUTPATIENT)
Age: 55
End: 2020-02-10

## 2020-02-10 VITALS
OXYGEN SATURATION: 99 % | RESPIRATION RATE: 16 BRPM | DIASTOLIC BLOOD PRESSURE: 51 MMHG | TEMPERATURE: 98 F | HEART RATE: 73 BPM | SYSTOLIC BLOOD PRESSURE: 111 MMHG

## 2020-02-10 LAB
ANION GAP SERPL CALC-SCNC: 12 MMO/L — SIGNIFICANT CHANGE UP (ref 7–14)
BUN SERPL-MCNC: 9 MG/DL — SIGNIFICANT CHANGE UP (ref 7–23)
CALCIUM SERPL-MCNC: 9.7 MG/DL — SIGNIFICANT CHANGE UP (ref 8.4–10.5)
CHLORIDE SERPL-SCNC: 108 MMOL/L — HIGH (ref 98–107)
CO2 SERPL-SCNC: 23 MMOL/L — SIGNIFICANT CHANGE UP (ref 22–31)
CREAT SERPL-MCNC: 0.46 MG/DL — LOW (ref 0.5–1.3)
GLUCOSE SERPL-MCNC: 151 MG/DL — HIGH (ref 70–99)
HCT VFR BLD CALC: 34.2 % — LOW (ref 34.5–45)
HGB BLD-MCNC: 11 G/DL — LOW (ref 11.5–15.5)
MAGNESIUM SERPL-MCNC: 2.3 MG/DL — SIGNIFICANT CHANGE UP (ref 1.6–2.6)
MCHC RBC-ENTMCNC: 26.1 PG — LOW (ref 27–34)
MCHC RBC-ENTMCNC: 32.2 % — SIGNIFICANT CHANGE UP (ref 32–36)
MCV RBC AUTO: 81 FL — SIGNIFICANT CHANGE UP (ref 80–100)
NRBC # FLD: 0 K/UL — SIGNIFICANT CHANGE UP (ref 0–0)
PHOSPHATE SERPL-MCNC: 3.3 MG/DL — SIGNIFICANT CHANGE UP (ref 2.5–4.5)
PLATELET # BLD AUTO: 280 K/UL — SIGNIFICANT CHANGE UP (ref 150–400)
PMV BLD: 9.7 FL — SIGNIFICANT CHANGE UP (ref 7–13)
POTASSIUM SERPL-MCNC: 3.7 MMOL/L — SIGNIFICANT CHANGE UP (ref 3.5–5.3)
POTASSIUM SERPL-SCNC: 3.7 MMOL/L — SIGNIFICANT CHANGE UP (ref 3.5–5.3)
RBC # BLD: 4.22 M/UL — SIGNIFICANT CHANGE UP (ref 3.8–5.2)
RBC # FLD: 13.7 % — SIGNIFICANT CHANGE UP (ref 10.3–14.5)
SODIUM SERPL-SCNC: 143 MMOL/L — SIGNIFICANT CHANGE UP (ref 135–145)
WBC # BLD: 12.54 K/UL — HIGH (ref 3.8–10.5)
WBC # FLD AUTO: 12.54 K/UL — HIGH (ref 3.8–10.5)

## 2020-02-10 PROCEDURE — 99239 HOSP IP/OBS DSCHRG MGMT >30: CPT | Mod: GC

## 2020-02-10 PROCEDURE — 99232 SBSQ HOSP IP/OBS MODERATE 35: CPT

## 2020-02-10 RX ADMIN — Medication 1 DROP(S): at 08:05

## 2020-02-10 RX ADMIN — Medication 104 MILLIGRAM(S): at 17:18

## 2020-02-10 RX ADMIN — Medication 104 MILLIGRAM(S): at 13:41

## 2020-02-10 RX ADMIN — Medication 25 MILLIGRAM(S): at 07:42

## 2020-02-10 RX ADMIN — Medication 1 PACKET(S): at 07:43

## 2020-02-10 RX ADMIN — Medication 1 PACKET(S): at 12:48

## 2020-02-10 RX ADMIN — TICAGRELOR 90 MILLIGRAM(S): 90 TABLET ORAL at 10:04

## 2020-02-10 RX ADMIN — Medication 104 MILLIGRAM(S): at 00:15

## 2020-02-10 RX ADMIN — Medication 81 MILLIGRAM(S): at 12:48

## 2020-02-10 RX ADMIN — Medication 104 MILLIGRAM(S): at 07:42

## 2020-02-10 RX ADMIN — Medication 25 MILLIGRAM(S): at 17:18

## 2020-02-10 NOTE — DISCHARGE NOTE PROVIDER - HOSPITAL COURSE
54 year old female with history of HTN, CAD and MI (12/9) s/p stents (2 stents on 12/13 and 1 stent on 1/7), who presented to the ED on 2/7 with left eye blurriness. Patient stated she was in her usual state of health until the morning of presentation. Upon awakening in the morning she noted that she redness and blurry vision in her left eye. She also described having eye pressure that was worsened with eye movement, and note having a left sided headache. She also reported experiencing intermittent upper arm numbness, that started a few days prior to her ocular symptoms. She denied fevers, chills, CP, SOB, abdominal pain, nausea, vomiting, diarrhea, constipation. No extremity weakness or issues with ambulation.        In the ED patient was found to be hypertensive, labs WNL, CTA head and neck without acute findings.        Pt admitted for optic neuritis and concern for multiple sclerosis, enhancing left optic nerve, parenchymal white matter changes, and left lip numbness (fitting with the larger, U-fiber T2 hyperintensity on MRI) is most c/w MS. Pt received MRI total spine which showed  NMO antibodies sent.     To complete solumedrol 250mg IVSS q6 for 3 days, then discharge home on a long taper of prednisone following such IVSS therapy as 1mg/kg/day (~60mg) for 11 days, followed by a 4 day taper (40-53-35-10-0). About half of patients with optic neuritis eventually develop kaylee MS 54 year old female with history of HTN, CAD and MI (12/9) s/p stents (2 stents on 12/13 and 1 stent on 1/7), who presented to the ED on 2/7 with left eye blurriness. Patient stated she was in her usual state of health until the morning of presentation. Upon awakening in the morning she noted that she redness and blurry vision in her left eye. She also described having eye pressure that was worsened with eye movement, and note having a left sided headache. She also reported experiencing intermittent upper arm numbness, that started a few days prior to her ocular symptoms. She denied fevers, chills, CP, SOB, abdominal pain, nausea, vomiting, diarrhea, constipation. No extremity weakness or issues with ambulation.        In the ED patient was found to be hypertensive, labs WNL, CTA head and neck without acute findings. MRI of orbits significant for optic neuritis.        Pt admitted for optic neuritis and concern for multiple sclerosis, enhancing left optic nerve, parenchymal white matter changes, and left lip numbness (fitting with the larger, U-fiber T2 hyperintensity on MRI) is most c/w MS. Pt received MRI total spine which showed no abnormalities. NMO antibodies sent. Pt unable to complete lumbar puncture and obtain csf studies due to requirement to continue maintenance DAPT.         Pt to complete solumedrol 250mg IVSS q6 for 3 days, then discharge home on a long taper of prednisone following such IVSS therapy as 1mg/kg/day (~60mg) for 11 days, followed by a 4 day taper (80-28-54-10-0). Pt hemodynamically stable for discharge, to follow up with neuro for further management and workup outpatient.

## 2020-02-10 NOTE — PROGRESS NOTE ADULT - ASSESSMENT
Assessment:     54y female w/ h/o cardiac disease, here with eye redness and pain. initial exam showed BCVA 20/20 OU, no APD, color full OU, EOM intact. CVF full OU, IOP wnl OU. Pen light exam w/ BORIS 2-3 o'clock OS, otherwise wnl. Posterior exam wnl. ESR 20 and CRP < 4. MRI brain/orbits performed and there are areas of T2 prolongation and subtle enhancement seen in the intraorbital segment of the left optic nerve suspicious for optic neuritis.     Case initial discussed with Dr. Khan (neuro-oph) patient may have subclinical optic neuritis w/o objective clinical findings at this time.    Today the pain with EOM resolved, VA 20/20, patient felt her vision improved and even better than the right eye.     - mgmt of optic neuritis per neurology  - patient would benefit from outpatient visual field testing, follow up with ophthalmology after being discharged within one week  - no intervention from ophthalmology at this moment  - discussed with patient about the findings     S/D/W Dr. Ashley    Follow-Up:  Patient should follow up his/her ophthalmologist or in the [facility] within one week  [address]  [place], [place] [id number] [phone]

## 2020-02-10 NOTE — DISCHARGE NOTE PROVIDER - NSDCFUADDAPPT_GEN_ALL_CORE_FT
Dr. Armen Puri [phone] (multiple sclerosis expert), and ophthalmology as outpatient.    [facility] Department of Ophthalmology after discharge at:    [address]  [place] [address]  [phone]

## 2020-02-10 NOTE — PROGRESS NOTE ADULT - ATTENDING COMMENTS
I have interviewed and examined the patient and reviewed the residents note including the history, exam, assessment, and plan.  I agree with the residents assessment and plan.    54y female w/ h/o cardiac disease, here with eye redness and pain. initial exam showed BCVA 20/20 OU, no APD, color full OU, EOM intact. CVF full OU, IOP wnl OU. Pen light exam w/ BORIS 2-3 o'clock OS, otherwise wnl. Posterior exam wnl. ESR 20 and CRP < 4. MRI brain/orbits performed and there are areas of T2 prolongation and subtle enhancement seen in the intraorbital segment of the left optic nerve suspicious for optic neuritis.     Case initial discussed with Dr. Khan (neuro-oph) patient may have subclinical optic neuritis w/o objective clinical findings at this time.    Today the pain with EOM resolved, VA 20/20, patient felt her vision improved and even better than the right eye.     - mgmt of optic neuritis per neurology  - patient would benefit from outpatient visual field testing, follow up with ophthalmology after being discharged within one week  - no intervention from ophthalmology at this moment  - discussed with patient about the findings     Patti Aslhey MD
53 yo F with history of HTN, CAD and MI (12/9) s/p stents (2 stents on 12/13 and 1 stent on 1/7), who presented to the ED on 2/7 with left blurry vision and LT eye pain with LT upper lip and LT upper arm numbness and tingling. PE Lt eye subconjunctival hemophage, vision grossly intact. MRI brain and orbits concerning for optic neuritis and MS. MR spine WNL, c/w steroid as per neuro, LP as per neuro although discussed with resident, patient currently on brillinta/ASA given recent stents, no clear urgency of LP for diagnosis, will discuss with the team and comment on this when they see the patient tomorrow.     Rest as above.
Patient was seen and examined personally by me. I have discussed the plan and reviewed the resident's note and agree with the above physical exam findings including assessment and plan except as indicated below.    55 yo F with history of HTN, CAD and MI (12/9) s/p recent stents on DAPT p/w left blurry vision and LT eye pain with LT upper lip and LT upper arm numbness and tingling. MRI brain and orbits concerning for optic neuritis and MS.     MRI spine with well defined nonenhancing focus of T1/T2 prolongation involving the central spinal cord. This extends from C2 to T1 and is most prominent at the C6 level measuring approximately 1.9 mm in widest diameter. This is likely compatible with an underlying syrinx.  Continue steroid taper as per neuro recs  No blurry vision, weakness or sensory deficits on exam.  DC home if cleared by neurology. DC time: 32 min

## 2020-02-10 NOTE — CHART NOTE - NSCHARTNOTEFT_GEN_A_CORE
To Whom It May Concern,    Krys Sylvester was admitted to [facility] from 2/8/2020 - 2/10/2020 for medical treatment. She is medically cleared to return to work and/or travel. If you have any further questions or concerns, call [phone].        Thank you,    Marcelo Olivier MD      Internal Medicine  [facility]

## 2020-02-10 NOTE — DISCHARGE NOTE NURSING/CASE MANAGEMENT/SOCIAL WORK - NSDCPNINST_GEN_ALL_CORE
patient alert and clinically stable. patient vss and afebrile. patient received iv steroid for eye inflammation . pt verbalized eyes feeling "much better". MRI completed during this admission. patient denies pain and discomfort. patient clinically stable for discharged to home, written and verbal information provided. will continue to monitor.

## 2020-02-10 NOTE — DISCHARGE NOTE PROVIDER - NSDCCPCAREPLAN_GEN_ALL_CORE_FT
PRINCIPAL DISCHARGE DIAGNOSIS  Diagnosis: Optic neuritis  Assessment and Plan of Treatment: You were admitted after finding inflammation of the nerve in your eye (optic neuritis). This may or may not be due to presentation of a condition called multiple sclerosis. Because you are required to be on aspirin and brilinta, we cannot do a lumbar puncture for further workup. The neurology team recommended follow up and to complete a taper of steroids, which were sent to your pharmacy. The ophthalmology doctors would also like you to follow up with them.

## 2020-02-10 NOTE — DISCHARGE NOTE NURSING/CASE MANAGEMENT/SOCIAL WORK - NSDCFUADDAPPT_GEN_ALL_CORE_FT
Dr. Armen Puri [phone] (multiple sclerosis expert), and ophthalmology as outpatient.    [facility] Department of Ophthalmology after discharge at:    [address]  [place] [address]  [phone]

## 2020-02-10 NOTE — DISCHARGE NOTE PROVIDER - NSFOLLOWUPCLINICS_GEN_ALL_ED_FT
[facility] - Ophthalmology  Ophthalmology  [address]  [place] [address]  Phone: [phone]  Fax:   Follow Up Time: 1 week [facility] - Ophthalmology  Ophthalmology  [address]  [place] [address]  Phone: [phone]  Fax:   Follow Up Time: 1 week

## 2020-02-10 NOTE — DISCHARGE NOTE NURSING/CASE MANAGEMENT/SOCIAL WORK - PATIENT PORTAL LINK FT
You can access the FollowMyHealth Patient Portal offered by [facility] by registering at the following website: [email or web address]. By joining [employer]’s FollowMyHealth portal, you will also be able to view your health information using other applications (apps) compatible with our system.

## 2020-02-10 NOTE — PROGRESS NOTE ADULT - PROBLEM SELECTOR PLAN 1
Pt with acute visual changes, eye pain and redness MRI consistent with optic neuritis  MRI brain with abnml T2 prolongation in periventricular white matter compatible for demyelinating disease- given patient also had L arm numbness, there is concern for MS. However we still have to consider the possibility of an ischemic, inflammatory or infectious process  -ophtho consulted; no acute intervention, rec outpt neuro-ophtho follow up   -neurology consulted, recommended high dose steroids, with taper as below  ----250mg q6 x 3 days (end 2/10), then 60mg x 11 days, followed by 4 day taper 67-69-25-10-0  -pending MRI total spine with contrast  -Requires lumbar puncture/ oligoclonal studies/IgG synthesis, to consult procedure team  -F/U neuro recs Pt with acute visual changes, eye pain and redness MRI consistent with optic neuritis  MRI brain with abnml T2 prolongation in periventricular white matter compatible for demyelinating disease- given patient also had L arm numbness, there is concern for MS. However we still have to consider the possibility of an ischemic, inflammatory or infectious process  -ophtho consulted; no acute intervention, rec outpt neuro-ophtho follow up   -neurology consulted, recommended high dose steroids, with taper as below  ----250mg q6 x 3 days (end 2/10), then 60mg x 11 days, followed by 4 day taper 34-13-09-10-0  -MRI total spine with contrast without abnormalities  -Cannot complete lumbar puncture/ oligoclonal studies/IgG synthesis due to DAPT, to follow up with neuro outpatient  -F/U neuro recs

## 2020-02-10 NOTE — PROGRESS NOTE ADULT - SUBJECTIVE AND OBJECTIVE BOX
Interval Events: No acute overnight events.       OBJECTIVE:  ICU Vital Signs Last 24 Hrs  T(C): 36.7 (09 Feb 2020 21:43), Max: 36.7 (09 Feb 2020 14:55)  T(F): 98 (09 Feb 2020 21:43), Max: 98 (09 Feb 2020 14:55)  HR: 73 (09 Feb 2020 21:43) (73 - 80)  BP: 115/53 (09 Feb 2020 21:43) (115/53 - 143/72)  BP(mean): --  ABP: --  ABP(mean): --  RR: 17 (09 Feb 2020 21:43) (17 - 18)  SpO2: 98% (09 Feb 2020 21:43) (98% - 100%)        02-09 @ 07:01  -  02-10 @ 07:00  --------------------------------------------------------  IN: 650 mL / OUT: 0 mL / NET: 650 mL      CAPILLARY BLOOD GLUCOSE      POCT Blood Glucose.: 104 mg/dL (08 Feb 2020 15:34)      PHYSICAL EXAM:  	GENERAL: NAD, well-developed  	HEAD: Atraumatic, Normocephalic  	EYES: EOMI, PERRLA, +conjunctival hemorrhage in lateral portion of left eye   	NECK: Supple, No JVD  	CHEST/LUNG: Clear to auscultation bilaterally; No wheezes/rales/rhonchi  	HEART: Regular rate and rhythm; No murmurs, rubs, or gallops  	ABDOMEN: Soft, Nontender, Nondistended; Bowel sounds present  	EXTREMITIES:  2+ dP pulses b/l, No clubbing, cyanosis, or edema  	PSYCH: reactive affect  	NEUROLOGY: AAOx3, non-focal  SKIN: No rashes or lesions      HOSPITAL MEDICATIONS:  aspirin  chewable 81 milliGRAM(s) Oral daily  ticagrelor 90 milliGRAM(s) Oral two times a day  metoprolol tartrate 25 milliGRAM(s) Oral two times a day  atorvastatin 80 milliGRAM(s) Oral at bedtime  methylPREDNISolone sodium succinate IVPB 250 milliGRAM(s) IV Intermittent every 6 hours  potassium phosphate / sodium phosphate powder 1 Packet(s) Oral three times a day with meals            LABS:                        11.5   2.85  )-----------( 270      ( 09 Feb 2020 07:07 )             37.5     Hgb Trend: 11.5<--, 11.6<--  02-09    137  |  104  |  11  ----------------------------<  151<H>  3.8   |  22  |  0.43<L>    Ca    9.8      09 Feb 2020 07:07  Phos  2.3     02-09  Mg     2.2     02-09      Creatinine Trend: 0.43<--, 0.59<--, 0.58<--, 0.59<--  PT/INR - ( 09 Feb 2020 07:07 )   PT: 11.3 SEC;   INR: 1.02          PTT - ( 09 Feb 2020 07:07 )  PTT:30.4 SEC Interval Events: No acute overnight events. Pt says eye blurriness and pressure are resolved, has no complaints this morning.      OBJECTIVE:  ICU Vital Signs Last 24 Hrs  T(C): 36.7 (09 Feb 2020 21:43), Max: 36.7 (09 Feb 2020 14:55)  T(F): 98 (09 Feb 2020 21:43), Max: 98 (09 Feb 2020 14:55)  HR: 73 (09 Feb 2020 21:43) (73 - 80)  BP: 115/53 (09 Feb 2020 21:43) (115/53 - 143/72)  BP(mean): --  ABP: --  ABP(mean): --  RR: 17 (09 Feb 2020 21:43) (17 - 18)  SpO2: 98% (09 Feb 2020 21:43) (98% - 100%)        02-09 @ 07:01  -  02-10 @ 07:00  --------------------------------------------------------  IN: 650 mL / OUT: 0 mL / NET: 650 mL      CAPILLARY BLOOD GLUCOSE      POCT Blood Glucose.: 104 mg/dL (08 Feb 2020 15:34)      PHYSICAL EXAM:  	GENERAL: NAD, well-developed  	HEAD: Atraumatic, Normocephalic  	EYES: EOMI, PERRLA, +conjunctival hemorrhage in lateral portion of left eye   	NECK: Supple, No JVD  	CHEST/LUNG: Clear to auscultation bilaterally; No wheezes/rales/rhonchi  	HEART: Regular rate and rhythm; No murmurs, rubs, or gallops  	ABDOMEN: Soft, Nontender, Nondistended; Bowel sounds present  	EXTREMITIES:  2+ dP pulses b/l, No clubbing, cyanosis, or edema  	PSYCH: reactive affect  	NEUROLOGY: AAOx3, non-focal  SKIN: No rashes or lesions      HOSPITAL MEDICATIONS:  aspirin  chewable 81 milliGRAM(s) Oral daily  ticagrelor 90 milliGRAM(s) Oral two times a day  metoprolol tartrate 25 milliGRAM(s) Oral two times a day  atorvastatin 80 milliGRAM(s) Oral at bedtime  methylPREDNISolone sodium succinate IVPB 250 milliGRAM(s) IV Intermittent every 6 hours  potassium phosphate / sodium phosphate powder 1 Packet(s) Oral three times a day with meals            LABS:                        11.5   2.85  )-----------( 270      ( 09 Feb 2020 07:07 )             37.5     Hgb Trend: 11.5<--, 11.6<--  02-09    137  |  104  |  11  ----------------------------<  151<H>  3.8   |  22  |  0.43<L>    Ca    9.8      09 Feb 2020 07:07  Phos  2.3     02-09  Mg     2.2     02-09      Creatinine Trend: 0.43<--, 0.59<--, 0.58<--, 0.59<--  PT/INR - ( 09 Feb 2020 07:07 )   PT: 11.3 SEC;   INR: 1.02          PTT - ( 09 Feb 2020 07:07 )  PTT:30.4 SEC

## 2020-02-10 NOTE — PROGRESS NOTE ADULT - SUBJECTIVE AND OBJECTIVE BOX
[facility] Ophthalmology Progress Note    S: patient was seen and examined bed side. Patient felt her vision is getting better and no pain during the eye movement now.  Follow up for subclinical optic neuritis    Mood and Affect Appropriate ( x ),  Oriented to Time, Place, and Person x 3 ( x )    Ophthalmology Exam    Visual acuity (cc): 20/20 OU  Pupils: PERRL OU, no APD  Ttono: 15/16 OU,   Extraocular movements (EOMs): grossly full OU, No pain during the movement  Confrontational Visual Field (CVF):  FULL OU  Color Plates: 12/12 OU    External:  PEN LIGHT EXAM  Lids/Lashes/Lacrimal Ducts: Flat OU  Sclera/Conjunctiva:  W+Q OD, os small subconjunctival hemorrhage in the temporal quadrant  Cornea: Cl OU  Anterior Chamber: D+F OU   Iris:  Flat OU  Lens:  Cl OU        Diagnostic Testing:        < from: MR Orbits w/wo IV Cont (02.07.20 @ 23:52) >  EXAM:  MR ORBITS ONLY WAWI      EXAM:  MR BRAIN WAW IC        PROCEDURE DATE:  Feb 7 2020         INTERPRETATION:  Clinical indication: Visual changes left eye.    MRI of the orbits were performed using axial and coronal T1 and T2-weighted sequence (with fat suppression). Sagittal T1, axial T1 T2 T2 FLAIR diffusion and susceptibility weighted sequence was performed through the brain. The patient was injected with approximately 7.5 cc Gadavist IV and sagittal coronal and axial T1-weighted sequences were performed through the orbits. Sagittal coronal and axial T1-weighted sequences performed through the brain.    This exam is compared with prior head CT and CT angiogram of the neck and brain performed on February 7, 2020.    Both globes appear normal symmetric.    There is subtle enhancement seen involving the intraorbital segment of the left optic nerve when compared with the right side. This is best seen on series 13 image 29 through 31. This finding could be compatible with optic neuritis. Please correlate clinically.    Evaluation of the rest of the intraorbital soft tissue structures appear normal.    Evaluation of the optic chiasm appears unremarkable    Parenchymal volume loss is identified    Abnormal T2 prolongation is seenin periventricular white matter region. This finding is nonspecific and has been associated with migraine headaches though the possibility of demyelinating disease, infection inflammation or ischemia cannot because excluded. Clinical correlation is recommended.    Cavum vellum interpositum is seen which is likely no clinical significance    There is no evidence acute hemorrhage mass mass effect or abnormal enhancement seen    Evaluation of the diffusion weighted sequence demonstrates no abnormal areas of restricted diffusion to suggest acute infarct.    The large vessels demonstrate normal flow voids    Left maxillary bilateral ethmoid sinus mucosal thickening is seen. Both mastoid and middle ear regions appear clear.    Impression: Subtle enhancement is seen involving the intraorbital segment left optic nerve which is suspicious for optic neuritis.    Abnormal T2 prolongation in periventricular white matter region is seen. This could be compatible with demyelinating disease though the possibility of underlying infectious inflammatory or ischemic process cannot be entirely excluded. Clinical correlation is recommended.        JEANNE FERNANDES M.D., ATTENDING RADIOLOGIST    < end of copied text > [facility] Ophthalmology Progress Note    S: Patient was seen and examined bed side. Patient felt her vision is getting better and no pain during the eye movement now.  Follow up for subclinical optic neuritis OS.    Mood and Affect Appropriate ( x ),  Oriented to Time, Place, and Person x 3 ( x )    Ophthalmology Exam    Visual acuity (cc): 20/20 OU  Pupils: PERRL OU, no APD  Ttono: 15/16 OU,   Extraocular movements (EOMs): grossly full OU, No pain with eye movement  Confrontational Visual Field (CVF):  FULL OU  Color Plates: 12/12 OU    PLE  External:  flat OU  Lids/Lashes/Lacrimal Ducts: Flat OU  Sclera/Conjunctiva:  W+Q OD, os small subconjunctival hemorrhage in the temporal quadrant  Cornea: Cl OU  Anterior Chamber: D+F OU   Iris:  Flat OU  Lens:  Cl OU        Diagnostic Testing:        < from: MR Orbits w/wo IV Cont (02.07.20 @ 23:52) >  EXAM:  MR ORBITS ONLY WAWI      EXAM:  MR BRAIN WAW IC        PROCEDURE DATE:  Feb 7 2020         INTERPRETATION:  Clinical indication: Visual changes left eye.    MRI of the orbits were performed using axial and coronal T1 and T2-weighted sequence (with fat suppression). Sagittal T1, axial T1 T2 T2 FLAIR diffusion and susceptibility weighted sequence was performed through the brain. The patient was injected with approximately 7.5 cc Gadavist IV and sagittal coronal and axial T1-weighted sequences were performed through the orbits. Sagittal coronal and axial T1-weighted sequences performed through the brain.    This exam is compared with prior head CT and CT angiogram of the neck and brain performed on February 7, 2020.    Both globes appear normal symmetric.    There is subtle enhancement seen involving the intraorbital segment of the left optic nerve when compared with the right side. This is best seen on series 13 image 29 through 31. This finding could be compatible with optic neuritis. Please correlate clinically.    Evaluation of the rest of the intraorbital soft tissue structures appear normal.    Evaluation of the optic chiasm appears unremarkable    Parenchymal volume loss is identified    Abnormal T2 prolongation is seenin periventricular white matter region. This finding is nonspecific and has been associated with migraine headaches though the possibility of demyelinating disease, infection inflammation or ischemia cannot because excluded. Clinical correlation is recommended.    Cavum vellum interpositum is seen which is likely no clinical significance    There is no evidence acute hemorrhage mass mass effect or abnormal enhancement seen    Evaluation of the diffusion weighted sequence demonstrates no abnormal areas of restricted diffusion to suggest acute infarct.    The large vessels demonstrate normal flow voids    Left maxillary bilateral ethmoid sinus mucosal thickening is seen. Both mastoid and middle ear regions appear clear.    Impression: Subtle enhancement is seen involving the intraorbital segment left optic nerve which is suspicious for optic neuritis.    Abnormal T2 prolongation in periventricular white matter region is seen. This could be compatible with demyelinating disease though the possibility of underlying infectious inflammatory or ischemic process cannot be entirely excluded. Clinical correlation is recommended.        JEANNE FERNANDES M.D., ATTENDING RADIOLOGIST    < end of copied text >

## 2020-02-10 NOTE — DISCHARGE NOTE PROVIDER - CARE PROVIDER_API CALL
Garret Puri)  Neurology  [address], [place] [address]  Phone: [phone]  Fax: [phone]  Follow Up Time: 1 week

## 2020-02-10 NOTE — DISCHARGE NOTE PROVIDER - NSDCMRMEDTOKEN_GEN_ALL_CORE_FT
Aspir 81 oral delayed release tablet: 1 tab(s) orally once a day  Brilinta (ticagrelor) 90 mg oral tablet: 1 tab(s) orally 2 times a day  colchicine 0.6 mg oral tablet: 1 tab(s) orally once a day  metoprolol tartrate 25 mg oral tablet: 1 tab(s) orally 2 times a day  rosuvastatin 20 mg oral tablet: 1 tab(s) orally once a day  triamterene-hydrochlorothiazide 37.5 mg-25 mg oral capsule: 1 cap(s) orally once a day Aspir 81 oral delayed release tablet: 1 tab(s) orally once a day  Brilinta (ticagrelor) 90 mg oral tablet: 1 tab(s) orally 2 times a day  colchicine 0.6 mg oral tablet: 1 tab(s) orally once a day  metoprolol tartrate 25 mg oral tablet: 1 tab(s) orally 2 times a day  predniSONE 20 mg oral tablet: 60 mg daily x11 days  40 mg daily x 4 days  20 mg daily x 4 days  10 mg daily x 4 days  rosuvastatin 20 mg oral tablet: 1 tab(s) orally once a day  triamterene-hydrochlorothiazide 37.5 mg-25 mg oral capsule: 1 cap(s) orally once a day

## 2020-02-14 LAB — AQP4 H2O CHANNEL AB SERPL IA-ACNC: NEGATIVE — SIGNIFICANT CHANGE UP

## 2020-07-04 NOTE — ED PROVIDER NOTE - CHIEF COMPLAINT
Sepsis due to undetermined organism The patient is a 54y Female complaining of Sepsis due to undetermined organism R/O Sepsis

## 2020-07-21 ENCOUNTER — RESULT REVIEW (OUTPATIENT)
Age: 55
End: 2020-07-21

## 2020-09-03 PROBLEM — I25.10 ATHEROSCLEROTIC HEART DISEASE OF NATIVE CORONARY ARTERY WITHOUT ANGINA PECTORIS: Chronic | Status: ACTIVE | Noted: 2020-02-08

## 2020-11-04 ENCOUNTER — APPOINTMENT (OUTPATIENT)
Dept: GASTROENTEROLOGY | Facility: CLINIC | Age: 55
End: 2020-11-04

## 2021-01-05 ENCOUNTER — APPOINTMENT (OUTPATIENT)
Dept: GASTROENTEROLOGY | Facility: CLINIC | Age: 56
End: 2021-01-05

## 2021-01-18 ENCOUNTER — RESULT REVIEW (OUTPATIENT)
Age: 56
End: 2021-01-18

## 2021-02-03 ENCOUNTER — INPATIENT (INPATIENT)
Facility: HOSPITAL | Age: 56
LOS: 0 days | Discharge: HOME | End: 2021-02-04
Attending: INTERNAL MEDICINE | Admitting: INTERNAL MEDICINE
Payer: COMMERCIAL

## 2021-02-03 VITALS
SYSTOLIC BLOOD PRESSURE: 156 MMHG | HEART RATE: 73 BPM | RESPIRATION RATE: 20 BRPM | DIASTOLIC BLOOD PRESSURE: 87 MMHG | OXYGEN SATURATION: 100 % | TEMPERATURE: 98 F | HEIGHT: 59 IN

## 2021-02-03 DIAGNOSIS — R07.9 CHEST PAIN, UNSPECIFIED: ICD-10-CM

## 2021-02-03 DIAGNOSIS — Z86.39 PERSONAL HISTORY OF OTHER ENDOCRINE, NUTRITIONAL AND METABOLIC DISEASE: Chronic | ICD-10-CM

## 2021-02-03 DIAGNOSIS — Z98.891 HISTORY OF UTERINE SCAR FROM PREVIOUS SURGERY: Chronic | ICD-10-CM

## 2021-02-03 DIAGNOSIS — I10 ESSENTIAL (PRIMARY) HYPERTENSION: ICD-10-CM

## 2021-02-03 LAB
ALBUMIN SERPL ELPH-MCNC: 4.6 G/DL — SIGNIFICANT CHANGE UP (ref 3.3–5)
ALP SERPL-CCNC: 66 U/L — SIGNIFICANT CHANGE UP (ref 40–120)
ALT FLD-CCNC: 47 U/L — HIGH (ref 4–33)
ANION GAP SERPL CALC-SCNC: 8 MMOL/L — SIGNIFICANT CHANGE UP (ref 7–14)
ANISOCYTOSIS BLD QL: SLIGHT — SIGNIFICANT CHANGE UP
APTT BLD: 33.5 SEC — SIGNIFICANT CHANGE UP (ref 27–36.3)
AST SERPL-CCNC: 33 U/L — HIGH (ref 4–32)
BASOPHILS # BLD AUTO: 0 K/UL — SIGNIFICANT CHANGE UP (ref 0–0.2)
BASOPHILS NFR BLD AUTO: 0 % — SIGNIFICANT CHANGE UP (ref 0–2)
BILIRUB SERPL-MCNC: 0.2 MG/DL — SIGNIFICANT CHANGE UP (ref 0.2–1.2)
BUN SERPL-MCNC: 12 MG/DL — SIGNIFICANT CHANGE UP (ref 7–23)
CALCIUM SERPL-MCNC: 9.7 MG/DL — SIGNIFICANT CHANGE UP (ref 8.4–10.5)
CHLORIDE SERPL-SCNC: 106 MMOL/L — SIGNIFICANT CHANGE UP (ref 98–107)
CO2 SERPL-SCNC: 25 MMOL/L — SIGNIFICANT CHANGE UP (ref 22–31)
CREAT SERPL-MCNC: 0.62 MG/DL — SIGNIFICANT CHANGE UP (ref 0.5–1.3)
EOSINOPHIL # BLD AUTO: 0.03 K/UL — SIGNIFICANT CHANGE UP (ref 0–0.5)
EOSINOPHIL NFR BLD AUTO: 0.9 % — SIGNIFICANT CHANGE UP (ref 0–6)
GLUCOSE BLDC GLUCOMTR-MCNC: 107 MG/DL — HIGH (ref 70–99)
GLUCOSE SERPL-MCNC: 102 MG/DL — HIGH (ref 70–99)
HCT VFR BLD CALC: 40.1 % — SIGNIFICANT CHANGE UP (ref 34.5–45)
HGB BLD-MCNC: 12 G/DL — SIGNIFICANT CHANGE UP (ref 11.5–15.5)
HYPOCHROMIA BLD QL: SLIGHT — SIGNIFICANT CHANGE UP
IANC: 1.92 K/UL — SIGNIFICANT CHANGE UP (ref 1.5–8.5)
INR BLD: 1.02 RATIO — SIGNIFICANT CHANGE UP (ref 0.88–1.16)
LYMPHOCYTES # BLD AUTO: 0.79 K/UL — LOW (ref 1–3.3)
LYMPHOCYTES # BLD AUTO: 21.9 % — SIGNIFICANT CHANGE UP (ref 13–44)
MCHC RBC-ENTMCNC: 25.3 PG — LOW (ref 27–34)
MCHC RBC-ENTMCNC: 29.9 GM/DL — LOW (ref 32–36)
MCV RBC AUTO: 84.6 FL — SIGNIFICANT CHANGE UP (ref 80–100)
MONOCYTES # BLD AUTO: 0.28 K/UL — SIGNIFICANT CHANGE UP (ref 0–0.9)
MONOCYTES NFR BLD AUTO: 7.9 % — SIGNIFICANT CHANGE UP (ref 2–14)
NEUTROPHILS # BLD AUTO: 2.36 K/UL — SIGNIFICANT CHANGE UP (ref 1.8–7.4)
NEUTROPHILS NFR BLD AUTO: 65.8 % — SIGNIFICANT CHANGE UP (ref 43–77)
OVALOCYTES BLD QL SMEAR: SLIGHT — SIGNIFICANT CHANGE UP
PLAT MORPH BLD: NORMAL — SIGNIFICANT CHANGE UP
PLATELET # BLD AUTO: 240 K/UL — SIGNIFICANT CHANGE UP (ref 150–400)
PLATELET COUNT - ESTIMATE: NORMAL — SIGNIFICANT CHANGE UP
POLYCHROMASIA BLD QL SMEAR: SLIGHT — SIGNIFICANT CHANGE UP
POTASSIUM SERPL-MCNC: 4.1 MMOL/L — SIGNIFICANT CHANGE UP (ref 3.5–5.3)
POTASSIUM SERPL-SCNC: 4.1 MMOL/L — SIGNIFICANT CHANGE UP (ref 3.5–5.3)
PROT SERPL-MCNC: 7.2 G/DL — SIGNIFICANT CHANGE UP (ref 6–8.3)
PROTHROM AB SERPL-ACNC: 11.7 SEC — SIGNIFICANT CHANGE UP (ref 10.6–13.6)
RBC # BLD: 4.74 M/UL — SIGNIFICANT CHANGE UP (ref 3.8–5.2)
RBC # FLD: 13.2 % — SIGNIFICANT CHANGE UP (ref 10.3–14.5)
RBC BLD AUTO: ABNORMAL
SARS-COV-2 RNA SPEC QL NAA+PROBE: SIGNIFICANT CHANGE UP
SMUDGE CELLS # BLD: PRESENT — SIGNIFICANT CHANGE UP
SODIUM SERPL-SCNC: 139 MMOL/L — SIGNIFICANT CHANGE UP (ref 135–145)
TROPONIN T, HIGH SENSITIVITY RESULT: <6 NG/L — SIGNIFICANT CHANGE UP
VARIANT LYMPHS # BLD: 3.5 % — SIGNIFICANT CHANGE UP (ref 0–6)
WBC # BLD: 3.59 K/UL — LOW (ref 3.8–10.5)
WBC # FLD AUTO: 3.59 K/UL — LOW (ref 3.8–10.5)

## 2021-02-03 PROCEDURE — 71046 X-RAY EXAM CHEST 2 VIEWS: CPT | Mod: 26

## 2021-02-03 PROCEDURE — 99285 EMERGENCY DEPT VISIT HI MDM: CPT

## 2021-02-03 PROCEDURE — 99223 1ST HOSP IP/OBS HIGH 75: CPT

## 2021-02-03 RX ORDER — DEXTROSE 50 % IN WATER 50 %
12.5 SYRINGE (ML) INTRAVENOUS ONCE
Refills: 0 | Status: DISCONTINUED | OUTPATIENT
Start: 2021-02-03 | End: 2021-02-04

## 2021-02-03 RX ORDER — GLUCAGON INJECTION, SOLUTION 0.5 MG/.1ML
1 INJECTION, SOLUTION SUBCUTANEOUS ONCE
Refills: 0 | Status: DISCONTINUED | OUTPATIENT
Start: 2021-02-03 | End: 2021-02-04

## 2021-02-03 RX ORDER — SODIUM CHLORIDE 9 MG/ML
1000 INJECTION, SOLUTION INTRAVENOUS
Refills: 0 | Status: DISCONTINUED | OUTPATIENT
Start: 2021-02-03 | End: 2021-02-04

## 2021-02-03 RX ORDER — METOPROLOL TARTRATE 50 MG
25 TABLET ORAL
Refills: 0 | Status: DISCONTINUED | OUTPATIENT
Start: 2021-02-03 | End: 2021-02-04

## 2021-02-03 RX ORDER — ATORVASTATIN CALCIUM 80 MG/1
80 TABLET, FILM COATED ORAL AT BEDTIME
Refills: 0 | Status: DISCONTINUED | OUTPATIENT
Start: 2021-02-03 | End: 2021-02-04

## 2021-02-03 RX ORDER — METOPROLOL TARTRATE 50 MG
1 TABLET ORAL
Qty: 0 | Refills: 0 | DISCHARGE

## 2021-02-03 RX ORDER — CLOPIDOGREL BISULFATE 75 MG/1
1 TABLET, FILM COATED ORAL
Qty: 0 | Refills: 0 | DISCHARGE

## 2021-02-03 RX ORDER — INSULIN LISPRO 100/ML
VIAL (ML) SUBCUTANEOUS AT BEDTIME
Refills: 0 | Status: DISCONTINUED | OUTPATIENT
Start: 2021-02-03 | End: 2021-02-04

## 2021-02-03 RX ORDER — INSULIN LISPRO 100/ML
VIAL (ML) SUBCUTANEOUS
Refills: 0 | Status: DISCONTINUED | OUTPATIENT
Start: 2021-02-03 | End: 2021-02-04

## 2021-02-03 RX ORDER — ENOXAPARIN SODIUM 100 MG/ML
40 INJECTION SUBCUTANEOUS DAILY
Refills: 0 | Status: DISCONTINUED | OUTPATIENT
Start: 2021-02-03 | End: 2021-02-04

## 2021-02-03 RX ORDER — DEXTROSE 50 % IN WATER 50 %
15 SYRINGE (ML) INTRAVENOUS ONCE
Refills: 0 | Status: DISCONTINUED | OUTPATIENT
Start: 2021-02-03 | End: 2021-02-04

## 2021-02-03 RX ORDER — DEXTROSE 50 % IN WATER 50 %
25 SYRINGE (ML) INTRAVENOUS ONCE
Refills: 0 | Status: DISCONTINUED | OUTPATIENT
Start: 2021-02-03 | End: 2021-02-04

## 2021-02-03 RX ORDER — TRIAMTERENE/HYDROCHLOROTHIAZID 75 MG-50MG
1 TABLET ORAL DAILY
Refills: 0 | Status: DISCONTINUED | OUTPATIENT
Start: 2021-02-03 | End: 2021-02-04

## 2021-02-03 RX ORDER — INFLUENZA VIRUS VACCINE 15; 15; 15; 15 UG/.5ML; UG/.5ML; UG/.5ML; UG/.5ML
0.5 SUSPENSION INTRAMUSCULAR ONCE
Refills: 0 | Status: DISCONTINUED | OUTPATIENT
Start: 2021-02-03 | End: 2021-02-04

## 2021-02-03 RX ORDER — ASPIRIN/CALCIUM CARB/MAGNESIUM 324 MG
1 TABLET ORAL
Qty: 0 | Refills: 0 | DISCHARGE

## 2021-02-03 RX ORDER — TRIAMTERENE/HYDROCHLOROTHIAZID 75 MG-50MG
1 TABLET ORAL
Qty: 0 | Refills: 0 | DISCHARGE

## 2021-02-03 RX ORDER — TICAGRELOR 90 MG/1
1 TABLET ORAL
Qty: 0 | Refills: 0 | DISCHARGE

## 2021-02-03 RX ORDER — ROSUVASTATIN CALCIUM 5 MG/1
1 TABLET ORAL
Qty: 0 | Refills: 0 | DISCHARGE

## 2021-02-03 RX ORDER — ASPIRIN/CALCIUM CARB/MAGNESIUM 324 MG
162 TABLET ORAL ONCE
Refills: 0 | Status: COMPLETED | OUTPATIENT
Start: 2021-02-03 | End: 2021-02-03

## 2021-02-03 RX ORDER — ASPIRIN/CALCIUM CARB/MAGNESIUM 324 MG
81 TABLET ORAL DAILY
Refills: 0 | Status: DISCONTINUED | OUTPATIENT
Start: 2021-02-03 | End: 2021-02-04

## 2021-02-03 RX ORDER — CLOPIDOGREL BISULFATE 75 MG/1
75 TABLET, FILM COATED ORAL DAILY
Refills: 0 | Status: DISCONTINUED | OUTPATIENT
Start: 2021-02-03 | End: 2021-02-04

## 2021-02-03 RX ADMIN — Medication 1 TABLET(S): at 21:02

## 2021-02-03 RX ADMIN — ATORVASTATIN CALCIUM 80 MILLIGRAM(S): 80 TABLET, FILM COATED ORAL at 21:02

## 2021-02-03 RX ADMIN — Medication 162 MILLIGRAM(S): at 15:54

## 2021-02-03 RX ADMIN — Medication 25 MILLIGRAM(S): at 20:02

## 2021-02-03 NOTE — H&P ADULT - NSHPPHYSICALEXAM_GEN_ALL_CORE
T(C): 36.8 (02-03-21 @ 19:45), Max: 36.8 (02-03-21 @ 19:45)  HR: 81 (02-03-21 @ 19:45) (64 - 81)  BP: 179/88 (02-03-21 @ 19:45) (156/87 - 186/91)  RR: 18 (02-03-21 @ 19:45) (15 - 20)  SpO2: 100% (02-03-21 @ 19:45) (100% - 100%)  Wt(kg): --  GENERAL: NAD, well-developed  HEAD:  Atraumatic, Normocephalic  EYES: EOMI, PERRLA, conjunctiva and sclera clear  NECK: Supple, No JVD  CHEST/LUNG: Clear to auscultation bilaterally; No wheeze  HEART: Regular rate and rhythm; No murmurs, rubs, or gallops  ABDOMEN: Soft, Nontender, Nondistended; Bowel sounds present  EXTREMITIES:  2+ Peripheral Pulses, No clubbing, cyanosis, or edema  PSYCH: AAOx3  NEUROLOGY: non-focal  SKIN: No rashes or lesions

## 2021-02-03 NOTE — CONSULT NOTE ADULT - ATTENDING COMMENTS
Agree with above assessment and plan as outlined above.    - f/u stress and echo    Armando Orellana MD, [facility]  BEEPER [phone]

## 2021-02-03 NOTE — H&P ADULT - HISTORY OF PRESENT ILLNESS
54 yo F hx CAD s/p prior PCI and MI, HTN, DM, HLD, history of optic neuritis in Feb of 2020 who presents with cp.  The patient reports one day history of chest pain a few hours after shoveling snow.  Pain is non-exertional and occurs with movement and palpation.  The patient was admitted for further workup.  Cardiac workup thus far includes normal ECG and negative enzymes.  The patient was chest pain free at time of eval.

## 2021-02-03 NOTE — ED PROVIDER NOTE - CLINICAL SUMMARY MEDICAL DECISION MAKING FREE TEXT BOX
Patient with prior MI seen for new exertional chest pain. Non toxic appearing. Hemodynamically stable. EKG without signs of acute ischemia. Cardiopulmonary exam unremarkable. Will send labs and CXR and give Aspirin. Anticipate admission.

## 2021-02-03 NOTE — H&P ADULT - NSHPLABSRESULTS_GEN_ALL_CORE
(02-03 @ 16:12)                      12.0  3.59 )-----------( 240                 40.1    Neutrophils = 2.36 (65.8%)  Lymphocytes = 0.79 (21.9%)  Eosinophils = 0.03 (0.9%)  Basophils = 0.00 (0.0%)  Monocytes = 0.28 (7.9%)  Bands = --%    02-03    139  |  106  |  12  ----------------------------<  102<H>  4.1   |  25  |  0.62    Ca    9.7      03 Feb 2021 16:12    TPro  7.2  /  Alb  4.6  /  TBili  0.2  /  DBili  x   /  AST  33<H>  /  ALT  47<H>  /  AlkPhos  66  02-03    ( 03 Feb 2021 16:12 )   PT: 11.7 sec;   INR: 1.02 ratio;    PTT:33.5 sec

## 2021-02-03 NOTE — H&P ADULT - NSHPREVIEWOFSYSTEMS_GEN_ALL_CORE
CONSTITUTIONAL: No weakness, fevers or chills  EYES/ENT: No visual changes;  No vertigo or throat pain   NECK: No pain or stiffness  RESPIRATORY: No cough, wheezing, hemoptysis; No shortness of breath  CARDIOVASCULAR: chest pain  GASTROINTESTINAL: No abdominal or epigastric pain. No nausea, vomiting, or hematemesis; No diarrhea or constipation. No melena or hematochezia.  GENITOURINARY: No dysuria, frequency or hematuria  NEUROLOGICAL: No numbness or weakness  SKIN: No itching, burning, rashes, or lesions   PSYCH: No Depression, no anxiety  All other review of systems is negative unless indicated above.

## 2021-02-03 NOTE — H&P ADULT - ASSESSMENT
54 yo F hx CAD s/p prior PCI and MI, HTN, DM, HLD, history of optic neuritis in Feb of 2020 who presents with cp.

## 2021-02-03 NOTE — H&P ADULT - PROBLEM SELECTOR PLAN 1
- Unlikely ACS given negative trop and currently symptoms free.  - Continue to monitor. Trend CE.  - Check TTE.  - NST.  - C/w meds for CAD: asa, plavix, BB.  - DVT ppx w/ Lovenox.

## 2021-02-03 NOTE — ED ADULT NURSE NOTE - OBJECTIVE STATEMENT
pt brought to 1a, A&ox3, skin w/d/i, amb @ baseline, c/o CP since last night associated w/ VILLATORO while walking up stairs, HX MI w/ stents, and HTN, states onset last night post shoveling snow, denies being similar to previous MI/stent episode, pain improved w/ rest, states discomfort on and off however mostly on, denies lightheadedness/dizziness, or N/V, appears comfortable on presentation, SL placed, labs sent, ASA as per orders, placed on CM for monitoring, awaiting results and further orders MD Monk @ bedside for eval.

## 2021-02-03 NOTE — ED PROVIDER NOTE - NS ED ROS FT
Constitutional: No fever or Chills.    Head, Eyes, Ears, Nose, Throat: No visual changes.  No tongue or throat swelling or pain.  Neck: No neck stiffness.  Pulmonary: No cough.  No wheezing.  Cardiovascular: No palpitations or diaphoresis.  Abdominal: No abdominal pain. No nausea, vomiting, diarrhea.    Genitourinary: No dysuria or hematuria.   Dermatologic: No rashes.  Allergic: No new exposures, mucosal swelling, or pruritis.  Neurologic: No headache, weakness, visual changes, speech changes, or sensory abnormalities.  No fainting episodes.

## 2021-02-03 NOTE — ED PROVIDER NOTE - ATTENDING CONTRIBUTION TO CARE
I performed a history and physical exam of the patient and discussed their management with the fellow reviewed the fellow's note and agree with the documented findings and plan of care. I have edited as appropriate. My medical decision making and observations are found above.

## 2021-02-03 NOTE — ED ADULT NURSE REASSESSMENT NOTE - NS ED NURSE REASSESS COMMENT FT1
pt appears in NAD @ this time, denies complaints, VS as noted, Tele BECKA Dwyer @ bedside for eval, aware of /91, awaiting further orders and bed assignment.

## 2021-02-03 NOTE — ED PROVIDER NOTE - PMH
CAD (coronary artery disease)    HTN (hypertension)    Hyperlipidemia    MI (myocardial infarction)

## 2021-02-03 NOTE — ED PROVIDER NOTE - PROGRESS NOTE DETAILS
Trop <6. Will admit for high risk chest pain.  No tachycardia/hypoxia, clinical signs of DVT or tearing sensation. Doubt PE or dissection.

## 2021-02-03 NOTE — ED ADULT TRIAGE NOTE - CHIEF COMPLAINT QUOTE
Pt c/o midsternal CP starting yesterday. Denies any n/v or SOB. HX 2 cardiac stents placed 1 year ago.

## 2021-02-03 NOTE — ED PROVIDER NOTE - OBJECTIVE STATEMENT
56 yo F with PMH of MI, HTN presents for chest pain since last night. Came on after exerting herself shoveling snow. Improved with rest. Mostly constant but waxes and wanes today. Associated with SOB with going up stairs today which is not common for her.  Denies palpitations, cough, fever, leg swelling, lightheadedness.

## 2021-02-04 ENCOUNTER — TRANSCRIPTION ENCOUNTER (OUTPATIENT)
Age: 56
End: 2021-02-04

## 2021-02-04 VITALS
OXYGEN SATURATION: 98 % | TEMPERATURE: 98 F | HEART RATE: 64 BPM | SYSTOLIC BLOOD PRESSURE: 139 MMHG | DIASTOLIC BLOOD PRESSURE: 79 MMHG | RESPIRATION RATE: 16 BRPM

## 2021-02-04 LAB
A1C WITH ESTIMATED AVERAGE GLUCOSE RESULT: 5.4 % — SIGNIFICANT CHANGE UP (ref 4–5.6)
ALBUMIN SERPL ELPH-MCNC: 4.5 G/DL — SIGNIFICANT CHANGE UP (ref 3.3–5)
ALP SERPL-CCNC: 65 U/L — SIGNIFICANT CHANGE UP (ref 40–120)
ALT FLD-CCNC: 49 U/L — HIGH (ref 4–33)
ANION GAP SERPL CALC-SCNC: 12 MMOL/L — SIGNIFICANT CHANGE UP (ref 7–14)
AST SERPL-CCNC: 31 U/L — SIGNIFICANT CHANGE UP (ref 4–32)
BILIRUB SERPL-MCNC: 0.3 MG/DL — SIGNIFICANT CHANGE UP (ref 0.2–1.2)
BUN SERPL-MCNC: 12 MG/DL — SIGNIFICANT CHANGE UP (ref 7–23)
CALCIUM SERPL-MCNC: 9.9 MG/DL — SIGNIFICANT CHANGE UP (ref 8.4–10.5)
CHLORIDE SERPL-SCNC: 104 MMOL/L — SIGNIFICANT CHANGE UP (ref 98–107)
CK MB BLD-MCNC: 1.7 % — SIGNIFICANT CHANGE UP (ref 0–2.5)
CK MB CFR SERPL CALC: 1.5 NG/ML — SIGNIFICANT CHANGE UP
CK SERPL-CCNC: 86 U/L — SIGNIFICANT CHANGE UP (ref 25–170)
CO2 SERPL-SCNC: 22 MMOL/L — SIGNIFICANT CHANGE UP (ref 22–31)
CREAT SERPL-MCNC: 0.61 MG/DL — SIGNIFICANT CHANGE UP (ref 0.5–1.3)
ESTIMATED AVERAGE GLUCOSE: 108 MG/DL — SIGNIFICANT CHANGE UP (ref 68–114)
GLUCOSE BLDC GLUCOMTR-MCNC: 106 MG/DL — HIGH (ref 70–99)
GLUCOSE BLDC GLUCOMTR-MCNC: 149 MG/DL — HIGH (ref 70–99)
GLUCOSE BLDC GLUCOMTR-MCNC: 96 MG/DL — SIGNIFICANT CHANGE UP (ref 70–99)
GLUCOSE SERPL-MCNC: 90 MG/DL — SIGNIFICANT CHANGE UP (ref 70–99)
HCT VFR BLD CALC: 41.3 % — SIGNIFICANT CHANGE UP (ref 34.5–45)
HGB BLD-MCNC: 12.4 G/DL — SIGNIFICANT CHANGE UP (ref 11.5–15.5)
MAGNESIUM SERPL-MCNC: 2.1 MG/DL — SIGNIFICANT CHANGE UP (ref 1.6–2.6)
MCHC RBC-ENTMCNC: 25.4 PG — LOW (ref 27–34)
MCHC RBC-ENTMCNC: 30 GM/DL — LOW (ref 32–36)
MCV RBC AUTO: 84.6 FL — SIGNIFICANT CHANGE UP (ref 80–100)
NRBC # BLD: 0 /100 WBCS — SIGNIFICANT CHANGE UP
NRBC # FLD: 0 K/UL — SIGNIFICANT CHANGE UP
PLATELET # BLD AUTO: 229 K/UL — SIGNIFICANT CHANGE UP (ref 150–400)
POTASSIUM SERPL-MCNC: 4.1 MMOL/L — SIGNIFICANT CHANGE UP (ref 3.5–5.3)
POTASSIUM SERPL-SCNC: 4.1 MMOL/L — SIGNIFICANT CHANGE UP (ref 3.5–5.3)
PROT SERPL-MCNC: 7.5 G/DL — SIGNIFICANT CHANGE UP (ref 6–8.3)
RBC # BLD: 4.88 M/UL — SIGNIFICANT CHANGE UP (ref 3.8–5.2)
RBC # FLD: 13.4 % — SIGNIFICANT CHANGE UP (ref 10.3–14.5)
SODIUM SERPL-SCNC: 138 MMOL/L — SIGNIFICANT CHANGE UP (ref 135–145)
TROPONIN T, HIGH SENSITIVITY RESULT: <6 NG/L — SIGNIFICANT CHANGE UP
WBC # BLD: 3.2 K/UL — LOW (ref 3.8–10.5)
WBC # FLD AUTO: 3.2 K/UL — LOW (ref 3.8–10.5)

## 2021-02-04 PROCEDURE — 93018 CV STRESS TEST I&R ONLY: CPT | Mod: GC

## 2021-02-04 PROCEDURE — 93306 TTE W/DOPPLER COMPLETE: CPT | Mod: 26

## 2021-02-04 PROCEDURE — 93016 CV STRESS TEST SUPVJ ONLY: CPT | Mod: GC

## 2021-02-04 PROCEDURE — 78452 HT MUSCLE IMAGE SPECT MULT: CPT | Mod: 26

## 2021-02-04 RX ORDER — ACETAMINOPHEN 500 MG
650 TABLET ORAL EVERY 6 HOURS
Refills: 0 | Status: DISCONTINUED | OUTPATIENT
Start: 2021-02-04 | End: 2021-02-04

## 2021-02-04 RX ADMIN — Medication 650 MILLIGRAM(S): at 16:45

## 2021-02-04 RX ADMIN — Medication 25 MILLIGRAM(S): at 09:24

## 2021-02-04 RX ADMIN — CLOPIDOGREL BISULFATE 75 MILLIGRAM(S): 75 TABLET, FILM COATED ORAL at 09:24

## 2021-02-04 NOTE — DISCHARGE NOTE PROVIDER - HOSPITAL COURSE
56 y/o Female, with a PmHx of CAD s/p prior PCI and MI, HTN, DM, HLD, history of optic neuritis in Feb of 2020, who presents to the [facility] ED with cp.  The patient reports one day history of chest pain a few hours after shoveling snow.  Pain is non-exertional and occurs with movement and palpation.  The patient was admitted for further workup.  Cardiac workup thus far includes normal ECG and negative enzymes.  The patient was chest pain free at time of eval. Admitted to telemetry for r/o acs.    On admission:    1. Chest pain  hsTrop: < 6 --> < 6            COVID neg  2/3 CXR - no acute cardiopulmonary findings  2/4 Echo - EF 61%, normal mitral valve. Minimal mitral regurgitation. Normal left ventricular internal dimensions and wall thicknesses. Endocardium not well visualized; grossly normal left ventricular systolic function. Normal right ventricular size and function.  - c/w meds for CAD: asa, plavix, BB.  2/4 Stress Test - myocardial perfusion SPECT results are normal at 91% or MPHR. The left ventricle was normal in size. Normal myocardial perfusion scan, with no evidence of infarction or inducible ischemia. LVEF 52%. Revealing normal LV systolic function. There is no segmental wall motion abnormality. RV function appeared normal.      2. Essential Hypertension  - monitor bp, cont meds, adjust as needed  - DASH diet    3. HLD  - fasting lipid profile, cont statin    Pt comfortable at this time and is now medically cleared for discharge home as per Dr. Zelaya. Outpatient follow up. Atypical chest pain.    Reviewed discharge medications with patient; All new medications requiring new prescription sent to pharmacy of patients choice. Reviewed need for prescription from previous home medications and new prescriptions sent if requested. Patient in agreement and understands.

## 2021-02-04 NOTE — PROGRESS NOTE ADULT - SUBJECTIVE AND OBJECTIVE BOX
chief complaint: CP    extended hpi: with > 4 characteristics: 56 yo F hx CAD s/p prior PCI and MI in 12/2019 in [place], HTN, DM, HLD, history of optic neuritis in Feb of 2020 who presents with cp that began last evening after shoveling snow.    Review of Systems:   Constitutional: [ ] fevers, [ ] chills.   Skin: [ ] dry skin. [ ] rashes.  Psychiatric: [ ] depression, [ ] anxiety.   Gastrointestinal: [ ] BRBPR, [ ] melena.   Neurological: [ ] confusion. [ ] seizures. [ ] shuffling gait.   Ears,Nose,Mouth and Throat: [ ] ear pain [ ] sore throat.   Eyes: [ ] diplopia.   Respiratory: [ ] hemoptysis. [ ] shortness of breath  Cardiovascular: See HPI above  Hematologic/Lymphatic: [ ] anemia. [ ] painful nodes. [ ] prolonged bleeding.   Genitourinary: [ ] hematuria. [ ] flank pain.   Endocrine: [ ] significant change in weight. [ ] intolerance to heat and cold.     Review of systems [x ] otherwise negative, [ ] otherwise unable to obtain    FH: no family history of sudden cardiac death in first degree relatives    SH: [ ] tobacco, [ ] alcohol, [ ] drugs    acetaminophen   Tablet .. 650 milliGRAM(s) Oral every 6 hours PRN  aspirin enteric coated 81 milliGRAM(s) Oral daily  atorvastatin 80 milliGRAM(s) Oral at bedtime  clopidogrel Tablet 75 milliGRAM(s) Oral daily  dextrose 40% Gel 15 Gram(s) Oral once  dextrose 5%. 1000 milliLiter(s) IV Continuous <Continuous>  dextrose 5%. 1000 milliLiter(s) IV Continuous <Continuous>  dextrose 50% Injectable 25 Gram(s) IV Push once  dextrose 50% Injectable 12.5 Gram(s) IV Push once  dextrose 50% Injectable 25 Gram(s) IV Push once  enoxaparin Injectable 40 milliGRAM(s) SubCutaneous daily  glucagon  Injectable 1 milliGRAM(s) IntraMuscular once  influenza   Vaccine 0.5 milliLiter(s) IntraMuscular once  insulin lispro (ADMELOG) corrective regimen sliding scale   SubCutaneous three times a day before meals  insulin lispro (ADMELOG) corrective regimen sliding scale   SubCutaneous at bedtime  metoprolol tartrate 25 milliGRAM(s) Oral two times a day  triamterene 37.5 mG/hydrochlorothiazide 25 mG Tablet 1 Tablet(s) Oral daily                            12.4   3.20  )-----------( 229      ( 04 Feb 2021 07:36 )             41.3       02-04    138  |  104  |  12  ----------------------------<  90  4.1   |  22  |  0.61    Ca    9.9      04 Feb 2021 07:36  Mg     2.1     02-04    TPro  7.5  /  Alb  4.5  /  TBili  0.3  /  DBili  x   /  AST  31  /  ALT  49<H>  /  AlkPhos  65  02-04      CARDIAC MARKERS ( 03 Feb 2021 23:24 )  x     / x     / 86 U/L / x     / 1.5 ng/mL        T(C): 36.5 (02-04-21 @ 14:30), Max: 36.8 (02-03-21 @ 19:45)  HR: 65 (02-04-21 @ 14:30) (53 - 84)  BP: 108/60 (02-04-21 @ 14:30) (108/60 - 179/88)  RR: 16 (02-04-21 @ 14:30) (16 - 18)  SpO2: 100% (02-04-21 @ 14:30) (98% - 100%)    General: Well nourished in no acute distress. Alert and Oriented * 3.   Head: Normocephalic and atraumatic.   Neck: No JVD. No bruits. Supple. Does not appear to be enlarged.   Cardiovascular: + S1,S2 ; RRR Soft systolic murmur at the left lower sternal border. No rubs noted.    Lungs: CTA b/l. No rhonchi, rales or wheezes.   Abdomen: + BS, soft. Non tender. Non distended. No rebound. No guarding.   Extremities: No clubbing/cyanosis/edema.   Neurologic: Moves all four extremities. Full range of motion.   Skin: Warm and moist. The patient's skin has normal elasticity and good skin turgor.   Psychiatric: Appropriate mood and affect.  Musculoskeletal: Normal range of motion, normal strength    DATA:    tele- SR    < from: Transthoracic Echocardiogram (02.04.21 @ 09:57) >  CONCLUSIONS:  1. Normal mitral valve. Minimal mitral regurgitation.  2. Normal left ventricular internal dimensions and wall  thicknesses.  3. Endocardium not well visualized; grossly normal left  ventricular systolic function.  4. Normal right ventricular size and function.  ------------------------------------------------------------------------  Confirmed on  2/4/2021 - 11:41:12 by Jack Koehler M.D.,  [facility], FASE    < end of copied text >      NST REPORT PENDING      56 yo F hx CAD s/p prior PCI and MI in 12/2019 in [place], HTN, DM, HLD, history of optic neuritis in Feb of 2020 who presents with cp that began last evening after shoveling snow.    --ACS ruled out  --TTE with structurally normal heart  --NST complete- report pending  --if no ischemia or infarct and DC planned--Pt to F/U with Dr Cardona on 2/15 at 2PM [phone]  --appreciate all consultants                  
chief complaint: chest pain     extended hpi:  55 year old female with history of CAD s/p prior PCI and MI, HTN, DM, HLD, history of optic neuritis in Feb of 2020 who is being seen for chest pain.      S: no chest pain or sob; ROS otherwise negative.     Review of Systems:   Constitutional: [ ] fevers, [ ] chills.   Skin: [ ] dry skin. [ ] rashes.  Psychiatric: [ ] depression, [ ] anxiety.   Gastrointestinal: [ ] BRBPR, [ ] melena.   Neurological: [ ] confusion. [ ] seizures. [ ] shuffling gait.   Ears,Nose,Mouth and Throat: [ ] ear pain [ ] sore throat.   Eyes: [ ] diplopia.   Respiratory: [ ] hemoptysis. [ ] shortness of breath  Cardiovascular: See HPI above  Hematologic/Lymphatic: [ ] anemia. [ ] painful nodes. [ ] prolonged bleeding.   Genitourinary: [ ] hematuria. [ ] flank pain.   Endocrine: [ ] significant change in weight. [ ] intolerance to heat and cold.     Review of systems [x ] otherwise negative, [ ] otherwise unable to obtain    FH: no family history of sudden cardiac death in first degree relatives    SH: [ ] tobacco, [ ] alcohol, [ ] drugs    acetaminophen   Tablet .. 650 milliGRAM(s) Oral every 6 hours PRN  aspirin enteric coated 81 milliGRAM(s) Oral daily  atorvastatin 80 milliGRAM(s) Oral at bedtime  clopidogrel Tablet 75 milliGRAM(s) Oral daily  dextrose 40% Gel 15 Gram(s) Oral once  dextrose 5%. 1000 milliLiter(s) IV Continuous <Continuous>  dextrose 5%. 1000 milliLiter(s) IV Continuous <Continuous>  dextrose 50% Injectable 25 Gram(s) IV Push once  dextrose 50% Injectable 12.5 Gram(s) IV Push once  dextrose 50% Injectable 25 Gram(s) IV Push once  enoxaparin Injectable 40 milliGRAM(s) SubCutaneous daily  glucagon  Injectable 1 milliGRAM(s) IntraMuscular once  influenza   Vaccine 0.5 milliLiter(s) IntraMuscular once  insulin lispro (ADMELOG) corrective regimen sliding scale   SubCutaneous three times a day before meals  insulin lispro (ADMELOG) corrective regimen sliding scale   SubCutaneous at bedtime  metoprolol tartrate 25 milliGRAM(s) Oral two times a day  triamterene 37.5 mG/hydrochlorothiazide 25 mG Tablet 1 Tablet(s) Oral daily                            12.4   3.20  )-----------( 229      ( 04 Feb 2021 07:36 )             41.3       02-04    138  |  104  |  12  ----------------------------<  90  4.1   |  22  |  0.61    Ca    9.9      04 Feb 2021 07:36  Mg     2.1     02-04    TPro  7.5  /  Alb  4.5  /  TBili  0.3  /  DBili  x   /  AST  31  /  ALT  49<H>  /  AlkPhos  65  02-04      CARDIAC MARKERS ( 03 Feb 2021 23:24 )  x     / x     / 86 U/L / x     / 1.5 ng/mL        T(C): 36.5 (02-04-21 @ 14:30), Max: 36.8 (02-03-21 @ 19:45)  HR: 65 (02-04-21 @ 14:30) (53 - 84)  BP: 108/60 (02-04-21 @ 14:30) (108/60 - 179/88)  RR: 16 (02-04-21 @ 14:30) (16 - 18)  SpO2: 100% (02-04-21 @ 14:30) (98% - 100%)  Wt(kg): --    I&O's Summary    03 Feb 2021 07:01  -  04 Feb 2021 07:00  --------------------------------------------------------  IN: 450 mL / OUT: 450 mL / NET: 0 mL    04 Feb 2021 07:01  -  04 Feb 2021 17:26  --------------------------------------------------------  IN: 740 mL / OUT: 0 mL / NET: 740 mL        General: Well nourished in no acute distress. Alert and Oriented * 3.   Head: Normocephalic and atraumatic.   Neck: No JVD. No bruits. Supple. Does not appear to be enlarged.   Cardiovascular: + S1,S2 ; RRR Soft systolic murmur at the left lower sternal border. No rubs noted.    Lungs: CTA b/l. No rhonchi, rales or wheezes.   Abdomen: + BS, soft. Non tender. Non distended. No rebound. No guarding.   Extremities: No clubbing/cyanosis/edema.   Neurologic: Moves all four extremities. Full range of motion.   Skin: Warm and moist. The patient's skin has normal elasticity and good skin turgor.   Psychiatric: Appropriate mood and affect.  Musculoskeletal: Normal range of motion, normal strength    TTE: < from: Transthoracic Echocardiogram (02.04.21 @ 09:57) >  CONCLUSIONS:  1. Normal mitral valve. Minimal mitral regurgitation.  2. Normal left ventricular internal dimensions and wall  thicknesses.  3. Endocardium not well visualized; grossly normal left  ventricular systolic function.  4. Normal right ventricular size and function.    < end of copied text >      A/P: 55 year old female with history of CAD s/p prior PCI and MI, HTN, DM, HLD, history of optic neuritis in Feb of 2020 who is being seen for chest pain.    -pt. currently chest pain free  -no high risk features currently with normal ECG  -no urgent cath indicated at this time  -would continue to trend CE  -TTE with normal LV function  -follow up NST  -further interventional workup pending above     Jorge Zelaya MD

## 2021-02-04 NOTE — DISCHARGE NOTE PROVIDER - NSDCMRMEDTOKEN_GEN_ALL_CORE_FT
Aspir 81 oral delayed release tablet: 1 tab(s) orally once a day  metoprolol tartrate 25 mg oral tablet: 1 tab(s) orally 2 times a day  Plavix 75 mg oral tablet: 1 tab(s) orally once a day  rosuvastatin 20 mg oral tablet: 1 tab(s) orally once a day  triamterene-hydrochlorothiazide 37.5 mg-25 mg oral capsule: 1 cap(s) orally once a day

## 2021-02-04 NOTE — DISCHARGE NOTE PROVIDER - NSDCCPCAREPLAN_GEN_ALL_CORE_FT
PRINCIPAL DISCHARGE DIAGNOSIS  Diagnosis: Chest pain, unspecified type  Assessment and Plan of Treatment: To be asymptomatic, to reduce risks factors such as hypertension, diabetes and hyperlipidemia to lower the risk of blood clots formation; and to prevent complications of coronary artery disease such as worsening chest pain, heart attack and death.   Continue low salt, fat, cholesterol and carbohydrate diet. Follow up with cardiologist and primary care physician's routine appointment.      SECONDARY DISCHARGE DIAGNOSES  Diagnosis: Hyperlipidemia  Assessment and Plan of Treatment: To maintain normal cholesterol levels to prevent stroke, coronary artery disease, peripheral vascular disease and heart attacks.   Low fat diet, exercise daily and continue current medications. Follow up with primary care physician and cardiologist for management.    Diagnosis: Essential hypertension  Assessment and Plan of Treatment: To maintain a normal blood pressure to prevent heart attack, stroke and renal failure.   Low sodium and fat diet, continue anti-hypertensive medications, and follow up with primary care physician.

## 2021-02-04 NOTE — DISCHARGE NOTE PROVIDER - CARE PROVIDER_API CALL
Jorge Zelaya (MD)  Cardiovascular Disease; Internal Medicine; Interventional Cardiology  [address] [place] [id number]  Phone: [phone]  Fax: [phone]  Follow Up Time:

## 2021-02-04 NOTE — CONSULT NOTE ADULT - SUBJECTIVE AND OBJECTIVE BOX
HISTORY OF PRESENT ILLNESS: HPI:  54 yo F hx CAD s/p prior PCI and MI, HTN, DM, HLD, history of optic neuritis in  who presents with cp.  The patient reports one day history of chest pain a few hours after shoveling snow.  Pain is non-exertional and occurs with movement and palpation.  The patient was admitted for further workup.  Cardiac workup thus far includes normal ECG and negative enzymes.  The patient was chest pain free at time of eval.    (2021 19:51)    2/4- resting comfortably.  had some skipping heartbeats after shoveling snow, along w/ her chest pain. no prior episodes. no lightheadedness or fainting, no dizzy spells.  no prior outpatient cardiac monitoring.  a 10 pt ROS was otherwise negative.    PAST MEDICAL & SURGICAL HISTORY:  CAD (coronary artery disease)    Hyperlipidemia    MI (myocardial infarction)    HTN (hypertension)    History of diabetes mellitus    H/O:     MEDICATIONS  (STANDING):  aspirin enteric coated 81 milliGRAM(s) Oral daily  atorvastatin 80 milliGRAM(s) Oral at bedtime  clopidogrel Tablet 75 milliGRAM(s) Oral daily  dextrose 40% Gel 15 Gram(s) Oral once  dextrose 5%. 1000 milliLiter(s) (50 mL/Hr) IV Continuous <Continuous>  dextrose 5%. 1000 milliLiter(s) (100 mL/Hr) IV Continuous <Continuous>  dextrose 50% Injectable 25 Gram(s) IV Push once  dextrose 50% Injectable 12.5 Gram(s) IV Push once  dextrose 50% Injectable 25 Gram(s) IV Push once  enoxaparin Injectable 40 milliGRAM(s) SubCutaneous daily  glucagon  Injectable 1 milliGRAM(s) IntraMuscular once  influenza   Vaccine 0.5 milliLiter(s) IntraMuscular once  insulin lispro (ADMELOG) corrective regimen sliding scale   SubCutaneous three times a day before meals  insulin lispro (ADMELOG) corrective regimen sliding scale   SubCutaneous at bedtime  metoprolol tartrate 25 milliGRAM(s) Oral two times a day  triamterene 37.5 mG/hydrochlorothiazide 25 mG Tablet 1 Tablet(s) Oral daily    Allergies    No Known Allergies    Intolerances    FAMILY HISTORY:  FH: HTN (hypertension)    Family history of myocardial infarction  passed away at age 62      Non-contributary for premature coronary disease or sudden cardiac death    SOCIAL HISTORY:    [x ] Non-smoker  [ ] Smoker  [ ] Alcohol      PHYSICAL EXAM:  T(C): 36.8 (21 @ 05:53), Max: 36.8 (21 @ 19:45)  HR: 84 (21 @ 09:21) (53 - 84)  BP: 120/85 (21 @ 09:21) (120/85 - 186/91)  RR: 18 (21 @ 05:53) (15 - 20)  SpO2: 98% (21 @ 05:53) (98% - 100%)  Wt(kg): --    General: Well nourished, no acute distress, alert and oriented x 3  Head: normocephalic, no trauma  Neck: no JVD, no bruit, supple, not enlarged  CV: S1S2, no S3, regular rate, rhythm is SINUS, no murmurs.    Lungs: clear BL, no rales or wheezes  Abdomen: bowel sounds +, soft, nontender, nondistended  Extremities: no clubbing, cyanosis or edema  Neuro: Moves all 4 extremities, sensation intact x 4 extremities  Skin: warm and moist, normal turgor  Psych: Mood and affect are appropriate for circumstances  MSK: normal range of motion and strength x4 extremities.    TELEMETRY: NSR	    ECG: NSR 	  Echo: EF 65%, normal biventricular size and function	  	  LABS:	 	                          12.4   3.20  )-----------( 229      ( 2021 07:36 )             41.3     -    138  |  104  |  12  ----------------------------<  90  4.1   |  22  |  0.61    Ca    9.9      2021 07:36  Mg     2.1     -    TPro  7.5  /  Alb  4.5  /  TBili  0.3  /  DBili  x   /  AST  31  /  ALT  49<H>  /  AlkPhos  65      ASSESSMENT/PLAN: 	55y Female w. chest pain and palpitations after shoveling snow.    Due to hx of MI, an Echo was ordered, and EF was normal.    No prior VT events and no ventricular arrhythmia on telemetry.  Based on this, no indication for primary prevention ICD.  Consider outpatient cardiac rhythm monitoring.  Check electrolytes while taking HCTZ/Triamterene and be prepared to replace K to 4-4.5 and Mg to ~2.      Deandre Alas M.D.  Cardiac Electrophysiology    office [phone]  pager [phone]
Requesting Physician : Dr. Orellana    Reason for Consultation: CAD, chest pain     HISTORY OF PRESENT ILLNESS: 55 year old female with history of CAD s/p prior PCI and MI, HTN, DM, HLD, history of optic neuritis in  who is being seen for chest pain.  The patient reports one day history of chest pain a few hours after shoveling snow.  Pain is non-exertional and occurs with movement and palpation.  The patient was admitted for further workup.  Cardiac workup thus far includes normal ECG and negative enzymes.  The patient was chest pain free upon evaluation.       PAST MEDICAL & SURGICAL HISTORY:  CAD (coronary artery disease)    Hyperlipidemia    MI (myocardial infarction)    HTN (hypertension)    History of diabetes mellitus    H/O:             MEDICATIONS:  MEDICATIONS  (STANDING):      Allergies    No Known Allergies    Intolerances        FAMILY HISTORY:  FH: HTN (hypertension)    Family history of myocardial infarction  passed away at age 62      Non-contributary for premature coronary disease or sudden cardiac death    SOCIAL HISTORY:    [x ] Non-smoker  [ ] Smoker  [ ] Alcohol      REVIEW OF SYSTEMS:  [x ]chest pain  [  ]shortness of breath  [  ]palpitations  [  ]syncope  [ ]near syncope [ ]upper extremity weakness   [ ] lower extremity weakness  [  ]diplopia  [  ]altered mental status   [  ]fevers  [ ]chills [ ]nausea  [ ]vomitting  [  ]dysphagia    [ ]abdominal pain  [ ]melena  [ ]BRBPR    [  ]epistaxis  [  ]rash    [ ]lower extremity edema        [x ] All others negative	  [ ] Unable to obtain    PHYSICAL EXAM:  T(C): 36.7 (21 @ 14:33), Max: 36.7 (21 @ 14:33)  HR: 64 (21 @ 16:22) (64 - 73)  BP: 186/91 (21 @ 16:22) (156/87 - 186/91)  RR: 15 (21 @ 16:22) (15 - 20)  SpO2: 100% (21 @ 16:22) (100% - 100%)  Wt(kg): --  I&O's Summary        HEENT:   Normal oral mucosa, PERRL, EOMI	  Lymphatic: No lymphadenopathy , no edema  Cardiovascular: Normal S1 S2, No JVD, No murmurs , Peripheral pulses palpable 2+ bilaterally  Respiratory: Lungs clear to auscultation, normal effort 	  Gastrointestinal:  Soft, Non-tender, + BS	  Skin: No rashes, No ecchymoses, No cyanosis, warm to touch  Musculoskeletal: Normal range of motion, normal strength  Psychiatry:  Mood & affect appropriate      TELEMETRY: SR	    ECG: SR 	  RADIOLOGY:  OTHER:     DIAGNOSTIC TESTING:  [ ] Echocardiogram: < from: Transthoracic Echocardiogram (20 @ 18:36) >  1. Normal left ventricular internal dimensions and wall  thicknesses.  2. Normal left ventricular systolic function. No segmental  wall motion abnormalities.  3. Normal left ventricular diastolic function.  4. Normal right ventricular size and function.  5. Estimated right ventricular systolic pressure equals 15  mm Hg, assuming right atrial pressure equals 10 mm Hg,  consistent with normal pulmonary pressures.  *** No previous Echo exam.  --------------------------------------    < end of copied text >    [ ]  Catheterization:  [ ] Stress Test:    	  	  LABS:	 	    CARDIAC MARKERS:                              12.0   3.59  )-----------( 240      ( 2021 16:12 )             40.1         139  |  106  |  12  ----------------------------<  102<H>  4.1   |  25  |  0.62    Ca    9.7      2021 16:12    TPro  7.2  /  Alb  4.6  /  TBili  0.2  /  DBili  x   /  AST  33<H>  /  ALT  47<H>  /  AlkPhos  66  -    proBNP:   Lipid Profile:   HgA1c:   TSH:     ASSESSMENT/PLAN: 55 year old female with history of CAD s/p prior PCI and MI, HTN, DM, HLD, history of optic neuritis in  who is being seen for chest pain.    -pt. currently chest pain free  -no high risk features currently with normal ECG  -no urgent cath indicated at this time  -would continue to trend CE  -if CE negative, check TTE and NST  -further interventional workup pending above    Jorge Zelaya MD     
    HISTORY OF PRESENT ILLNESS: HPI:  54 yo F hx CAD s/p prior PCI and MI in 2019 in [place], HTN, DM, HLD, history of optic neuritis in  who presents with cp that began last evening after shoveling snow.  The patient reports one day history of chest pain a few hours after shoveling snow.  Pain is non-exertional and occurs with movement and palpation.  The patient was admitted for further workup.  Cardiac workup thus far includes normal ECG and negative enzymes.  The patient was chest pain free at time of eval.    (2021 19:51)      PAST MEDICAL & SURGICAL HISTORY:  CAD (coronary artery disease)    Hyperlipidemia    MI (myocardial infarction)    HTN (hypertension)    History of diabetes mellitus    H/O:     MEDICATIONS  (STANDING):  aspirin enteric coated 81 milliGRAM(s) Oral daily  atorvastatin 80 milliGRAM(s) Oral at bedtime  clopidogrel Tablet 75 milliGRAM(s) Oral daily  dextrose 40% Gel 15 Gram(s) Oral once  dextrose 5%. 1000 milliLiter(s) (50 mL/Hr) IV Continuous <Continuous>  dextrose 5%. 1000 milliLiter(s) (100 mL/Hr) IV Continuous <Continuous>  dextrose 50% Injectable 25 Gram(s) IV Push once  dextrose 50% Injectable 12.5 Gram(s) IV Push once  dextrose 50% Injectable 25 Gram(s) IV Push once  enoxaparin Injectable 40 milliGRAM(s) SubCutaneous daily  glucagon  Injectable 1 milliGRAM(s) IntraMuscular once  influenza   Vaccine 0.5 milliLiter(s) IntraMuscular once  insulin lispro (ADMELOG) corrective regimen sliding scale   SubCutaneous three times a day before meals  insulin lispro (ADMELOG) corrective regimen sliding scale   SubCutaneous at bedtime  metoprolol tartrate 25 milliGRAM(s) Oral two times a day  triamterene 37.5 mG/hydrochlorothiazide 25 mG Tablet 1 Tablet(s) Oral daily      Allergies  No Known Allergies      FAMILY HISTORY:  FH: HTN (hypertension)    Family history of myocardial infarction  passed away at age 62    Non-contributary for sudden cardiac death    SOCIAL HISTORY:    [x ] Non-smoker  [ ] Smoker  [ ] Alcohol    FLU VACCINE THIS YEAR STARTS IN AUGUST:  [ ] Yes    [ ] No    IF OVER 65 HAVE YOU EVER HAD A PNA VACCINE:  [ ] Yes    [ ] No       [x ] N/A      REVIEW OF SYSTEMS:  [ x]chest pain  [  ]shortness of breath  [  ]palpitations  [  ]syncope  [ ]near syncope [ ]upper extremity weakness   [ ] lower extremity weakness  [  ]diplopia  [  ]altered mental status   [  ]fevers  [ ]chills [ ]nausea  [ ]vomitting  [  ]dysphagia    [ ]abdominal pain  [ ]melena  [ ]BRBPR    [  ]epistaxis  [  ]rash    [ ]lower extremity edema        [x ] All others negative	  [ ] Unable to obtain      LABS:	 	    CARDIAC MARKERS:  CARDIAC MARKERS ( 2021 23:24 )  x     / x     / 86 U/L / x     / 1.5 ng/mL  Trop T <6                            12.4   3.20  )-----------( 229      ( 2021 07:36 )             41.3     Hb Trend: 12.4<--, 12.0<--    02-04    138  |  104  |  12  ----------------------------<  90  4.1   |  22  |  0.61    Ca    9.9      2021 07:36  Mg     2.1     04    TPro  7.5  /  Alb  4.5  /  TBili  0.3  /  DBili  x   /  AST  31  /  ALT  49<H>  /  AlkPhos  65  02-04    Creatinine Trend: 0.61<--, 0.62<--    Coags:  PT/INR - ( 2021 16:12 )   PT: 11.7 sec;   INR: 1.02 ratio      PTT - ( 2021 16:12 )  PTT:33.5 sec    PHYSICAL EXAM:  T(C): 36.8 (21 @ 05:53), Max: 36.8 (21 @ 19:45)  HR: 84 (21 @ 09:21) (53 - 84)  BP: 120/85 (21 @ 09:21) (120/85 - 186/91)  RR: 18 (21 @ 05:53) (15 - 20)  SpO2: 98% (21 @ 05:53) (98% - 100%)  Wt(kg): --   BMI (kg/m2): 35.4 (21 @ 23:20)  I&O's Summary    2021 07:01  -  2021 07:00  --------------------------------------------------------  IN: 450 mL / OUT: 450 mL / NET: 0 mL    Gen: Appears well in NAD  HEENT:  (-)icterus (-)pallor  CV: N S1 S2 1/6 AARTI (+)2 Pulses B/l  Resp:  Clear to ausculatation B/L, normal effort  GI: (+) BS Soft, NT, ND  Lymph:  (-)Edema, (-)obvious lymphadenopathy  Skin: Warm to touch, Normal turgor  Psych: Appropriate mood and affect    ECG:  NSR	    ASSESSMENT/PLAN: 	54 yo F hx CAD s/p prior PCI and MI in 2019 in [place], HTN, DM, HLD, history of optic neuritis in  who presents with cp that began last evening after shoveling snow.    --admit to tele  --Interventional Cardiology consult  --trend CE  --if ACS rules out, plan for TTE and NST    --further reccs pending above      
DC instructions

## 2021-02-04 NOTE — PROGRESS NOTE ADULT - ATTENDING COMMENTS
Patient seen and examined, agree with above assessment and plan as transcribed above.    - no pertinent findings on stress or echo  - D/C today    Armando Orellana MD, [facility]  BEEPER [phone]

## 2021-02-04 NOTE — DISCHARGE NOTE NURSING/CASE MANAGEMENT/SOCIAL WORK - PATIENT PORTAL LINK FT
You can access the FollowMyHealth Patient Portal offered by [facility] by registering at the following website: [email or web address]. By joining [employer]’s FollowMyHealth portal, you will also be able to view your health information using other applications (apps) compatible with our system.

## 2021-02-04 NOTE — DISCHARGE NOTE PROVIDER - NSDCHOSPICE_GEN_A_CORE
2300 Bedside shift change report given to CÉSAR Duncan RN (oncoming nurse) by Lucien Davis. [facility] (offgoing nurse). Report included the following information SBAR, Kardex, Intake/Output and MAR. No

## 2021-05-11 ENCOUNTER — APPOINTMENT (OUTPATIENT)
Dept: GASTROENTEROLOGY | Facility: CLINIC | Age: 56
End: 2021-05-11

## 2021-08-02 NOTE — ED PROVIDER NOTE - CPE EDP ENMT NORM
Nosebleeds Normal Treatment: I explained this is common when taking isotretinoin. I recommended saline mist in each nostril multiple times a day. If this worsens they will contact us. normal... Female Pregnancy Counseling Text: Female patients should also be on two forms of birth control while taking this medication and for one month after their last dose. Retinoid Dermatitis Aggressive Treatment: I recommended more frequent application of Cetaphil or CeraVe to the areas of dermatitis. I also prescribed a topical steroid for twice daily use until the dermatitis resolves. Upper Range (In Mg/Kg): 150 Myalgia Monitoring: I explained this is common when taking isotretinoin. If this worsens they will contact us. Include Validation In Note: Yes Calculate Months Of Therapy Based On Documented Dosages (Will Hide Months Of Therapy Question)?: No Xerosis Aggressive Treatment: I recommended application of Cetaphil or CeraVe numerous times a day and before going to bed to all dry areas. I also prescribed a topical steroid for twice daily use. Hypertriglyceridemia Monitoring: I explained this is common when taking isotretinoin. We will monitor closely. Kilograms Preamble Statement (Weight Entered In Details Tab): Reported Weight in kilograms: Male Completion Statement: After discussing his treatment course we decided to discontinue isotretinoin therapy at this time. He shouldn't donate blood for one month after the last dose. He should call with any new symptoms of depression. Use Therapeutic Ranged Or Therapeutic Target: please select Range or Target Next Month's Dosage: Continue Current Dosage Cheilitis Aggressive Treatment: I recommended application of Vaseline or Aquaphor numerous times a day (as often as every hour) and before going to bed. I also prescribed a topical steroid for twice daily use. Lower Range (In Mg/Kg): 120 Xerosis Normal Treatment: I recommended application of Cetaphil or CeraVe numerous times a day going to bed to all dry areas. Female Completion Statement: After discussing her treatment course we decided to discontinue isotretinoin therapy at this time. I explained that she would need to continue her birth control methods for at least one month after the last dosage. She should also get a pregnancy test one month after the last dose. She shouldn't donate blood for one month after the last dose. She should call with any new symptoms of depression. Detail Level: Zone Counseling Text: I reviewed the side effect in detail. Patient should get monthly blood tests, not donate blood, not drive at night if vision affected, and not share medication. Cheilitis Normal Treatment: I recommended application of Vaseline or Aquaphor numerous times a day (as often as every hour) and before going to bed. Xerosis Normal Treatment: I recommended application of Cetaphil or CeraVe numerous times a day and before going to bed to all dry areas. Months Of Therapy Completed: 6 Myalgia Treatment: I explained this is common when taking isotretinoin. If this worsens they will contact us. They may try OTC ibuprofen. Headache Monitoring: I recommended monitoring the headaches for now. There is no evidence of increased intracranial pressure. They were instructed to call if the headaches are worsening. Pounds Preamble Statement (Weight Entered In Details Tab): Reported Weight in pounds: Retinoid Dermatitis Normal Treatment: I recommended more frequent application of Cetaphil or CeraVe to the areas of dermatitis. Xerosis Aggressive Treatment: I recommended application of Cetaphil or CeraVe numerous times a day going to bed to all dry areas. I also prescribed a topical steroid for twice daily use. Weight Units: pounds Target Cumulative Dosage (In Mg/Kg): 135 What Is The Patient's Gender: Male

## 2022-01-25 ENCOUNTER — RESULT REVIEW (OUTPATIENT)
Age: 57
End: 2022-01-25

## 2022-08-10 NOTE — ED PROVIDER NOTE - CLINICAL SUMMARY MEDICAL DECISION MAKING FREE TEXT BOX
Please sign annual order for service to [facility].  
done  
54 y/o female w/ lower abd pain, vaginal discharge and fever- TOA vs PID vs ovarian cyst/torsion vs UTI vs Pyelo- labs, pain control, US, ua, reassess

## 2023-07-05 NOTE — DISCHARGE NOTE PROVIDER - DISCHARGE DATE
Duplicate request
METOPROLOL TARTRATE 50 MG Oral Tab - [facility] -Pt is out of meds.
04-Feb-2021

## 2023-07-22 NOTE — ED CDU PROVIDER DISPOSITION NOTE - NS_EDPROVIDERDISPOUSERTYPE_ED_A_ED
Attending Attestation (For Attendings USE Only)... The results of any blood tests and imaging studies completed during your visit today were discussed and explained to you and a copy provided with your discharge instructions. Please follow up with your primary care doctor within 48 hours.

## 2024-04-01 NOTE — PATIENT PROFILE ADULT - NSPRESCRUSEDDRG_GEN_A_NUR
HEARING AID     Audiology/HEARING AIDS  Date of purchase/dispense date: 11/14/2018   repair warranty expiration date: 1/21/2020   L/D expiration date: 1/21/2020  : Billy Encarnacion  Model/style/color: Arvind Beaver 50R/leeannaagne  Serial # right: [id number] // Serial # left: [id number]  /retention loop: #1 Standard with ear   Battery size: Internal Lithium Ion  Dome: Small closed smokey  Supplies/wax filters: Cerustop  Accessory: Phonak  Case  ---Accessory warranty expiration date: 1/21/2020   used: IndigoVisionbe II  Payor: Private pay    Updated by AMY Higgins on 1/4/2024    REASON FOR VISIT:  69-year-old female dropped off her left hearing aid recently to be checked. It was received with a broken  wire. SERVICES PROVIDED:  The  was replaced as well as the dome the hearing aid was cleaned thoroughly the hearing aid was charged. And after charging it taking it out of the  and turning the device on the hearing aid is in excellent working order. RECOMMENDATIONS:  The patient was advised to pick the device up at the reception desk and to return as needed. No

## 2024-06-08 NOTE — DISCHARGE NOTE PROVIDER - NSDCADMDATE_GEN_ALL_CORE_FT
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 Patient reports she was on her mobility scooter about to enter her door when she suddenly syncopized. She notes mild nausea and palpitations. Since then she has had mild nausea and a slight discomfort in her chest. She passed out and lost consciousness but denies hitting her head.  Denies fevers, chills, cough but producing some phlegm. Denies new lower extremity edema but she is chronically on antibiotics for an infected right joint. Denies history of DVT or PE. Not currently on anticoagulation. Denies history of similar episodes. Denies any cardiac history, chest pain but does endorse chest discomfort and SOB.    In the ED patient vitally stable, with /60, , RR 20, afebrile. Labs significant for HB 11.9, D dimer >33, Na 138, Na 4.1, BUN 23, Cr 1.4, troponin 0.389, . CT chest was done that showed extensive pulmonary emboli throughout the bilateral lungs including a saddle pulmonary embolus. There is evidence of right heart strain with flattening of the interventricular septum and enlargement of the right ventricle and main pulmonary artery.     * No surgery found *      Hospital Course:   Admitted for submassive PE. Was HDS and on RA, cards without acute intervention. Switched from heparin gtt to Eliquis. TTE without RV strain. Cards to re-evaluate  13-Jan-2020 19:16 patient given uptrend in lactic acidosis. One time lasix was given. Not a candidate for GDMT due to soft pressures, age and other co morbidities. PT/OT recommended SNF but the patient denied. Stable for discharge with outpatient follow up with PCP and heart failure transitional clinic.     Goals of Care Treatment Preferences:  Code Status: Full Code    Living Will: Yes  LaPOST: Yes  What is most important right now is to focus on remaining as independent as possible.  Accordingly, we have decided that the best plan to meet the patient's goals includes continuing with treatment.      Consults:     No new Assessment & Plan notes have been filed under this hospital service since the last note was generated.  Service: Hospital Medicine    Final Active Diagnoses:    Diagnosis Date Noted POA    PRINCIPAL PROBLEM:  Pulmonary embolism [I26.99] 06/05/2024 Yes    Acute pulmonary embolism [I26.99] 06/07/2024 Yes    Essential hypertension [I10] 06/05/2024 Yes    Hyperlipidemia [E78.5] 06/05/2024 Yes    Gout [M10.9] 04/09/2019 Yes    Morbid obesity with BMI of 50.0-59.9, adult [E66.01, Z68.43] 10/04/2018 Not Applicable    Type 2 diabetes mellitus with diabetic polyneuropathy, with long-term current use of insulin [E11.42, Z79.4] 01/29/2013 Not Applicable      Problems Resolved During this Admission:       Discharged Condition: stable    Disposition: Home or Self Care    Follow Up:   Follow-up Information       Tami Schmidt MD Follow up.    Specialty: Internal Medicine  Contact information:  [address] [facility] [address]  [phone]               [facility] Follow up in 2 day(s).    Specialties: Home Health Services, Home Therapy Services, Home Living Aide Services  Why: If you do not hear from Suraj by Monday, please call above  number for an appointment.  Contact information:  [address]  [address]  [facility] [address] [phone]                         Patient  Instructions:      Ambulatory referral/consult to Heart Failure Transitional Care Clinic   Standing Status: Future   Referral Priority: Routine Referral Type: Consultation   Referral Reason: Specialty Services Required   Requested Specialty: Cardiology   Number of Visits Requested: 1     Ambulatory referral/consult to Internal Medicine   Standing Status: Future   Referral Priority: Routine Referral Type: Consultation   Referral Reason: Specialty Services Required   Requested Specialty: Internal Medicine   Number of Visits Requested: 1       Significant Diagnostic Studies: Labs: All labs within the past 24 hours have been reviewed    Pending Diagnostic Studies:       None           Medications:  Reconciled Home Medications:      Medication List        START taking these medications      * ELIQUIS DVT-PE TREAT 30D START 5 mg (74 tabs) Dspk  Generic drug: apixaban  For the first 7 days take two 5 mg tablets twice daily.  After 7 days take one 5 mg tablet twice daily. 1ST SCRIPT     * ELIQUIS 5 mg Tab  Generic drug: apixaban  Take 1 tablet (5 mg total) by mouth 2 (two) times daily. 2ND SCRIPT; DO NOT TAKE UNTIL 1ST SCRIPT IS COMPLETE           * This list has 2 medication(s) that are the same as other medications prescribed for you. Read the directions carefully, and ask your doctor or other care provider to review them with you.                CHANGE how you take these medications      blood sugar diagnostic Strp  BG monitoring 4 times a day. Pt needs test strips for [employer] Talking meter.  What changed: additional instructions     insulin degludec 100 unit/mL (3 mL) insulin pen  Commonly known as: TRESIBA FLEXTOUCH U-100  Inject 10-18 units at night  What changed:   how much to take  how to take this  when to take this  additional instructions     lancets Misc  Test daily  What changed: additional instructions     OZEMPIC 0.25 mg or 0.5 mg (2 mg/3 mL) pen injector  Generic drug: semaglutide  INJECT 0.5 MG  "SUBCUTANEOUSLY ONCE A WEEK  What changed: See the new instructions.            CONTINUE taking these medications      albuterol 90 mcg/actuation inhaler  Commonly known as: PROVENTIL/VENTOLIN HFA  Inhale 2 puffs into the lungs every 4 (four) hours as needed for Wheezing or Shortness of Breath. Rescue     albuterol-ipratropium 2.5 mg-0.5 mg/3 mL nebulizer solution  Commonly known as: DUO-NEB  USE 1 VIAL PER NEBULIZER EVERY 6 HOURS AS NEEDED FOR WHEEZING/RESCUE     * allopurinoL 300 MG tablet  Commonly known as: ZYLOPRIM  Take 1 tablet (300 mg total) by mouth once daily.     * allopurinoL 100 MG tablet  Commonly known as: ZYLOPRIM  Take 1 tablet (100 mg total) by mouth once daily.     amLODIPine 5 MG tablet  Commonly known as: NORVASC  Take 1 tablet (5 mg total) by mouth once daily.     ASPERCREME WITH ALOE 10 % Crea  Generic drug: trolamine salicylate-aloe vera  Apply 1 Application topically as needed (to legs/back).     atorvastatin 80 MG tablet  Commonly known as: LIPITOR  TAKE ONE TABLET BY MOUTH EVERY DAY     COMP-AIR ELITE COMP NEB SYSTEM Berna  Generic drug: nebulizer and compressor  use as directed     diclofenac sodium 1 % Gel  Commonly known as: VOLTAREN  APPLY 2 GRAMS TOPICALLY DAILY     docusate sodium 100 MG capsule  Commonly known as: COLACE  Take 1 capsule (100 mg total) by mouth once daily.     doxycycline 100 MG tablet  Commonly known as: VIBRA-TABS  Take 1 tablet (100 mg total) by mouth 2 (two) times daily.     fluticasone propionate 50 mcg/actuation nasal spray  Commonly known as: FLONASE  2 sprays (100 mcg total) by Each Nare route once daily.     olopatadine 0.1 % ophthalmic solution  Commonly known as: PATANOL  Place 1 drop into both eyes 2 (two) times daily.     pen needle, diabetic 29 gauge x 1/2" Ndle  Commonly known as: PEN NEEDLE  USE DAILY     SENNA 8.6 mg tablet  Generic drug: senna  Take 1 tablet by mouth once daily.     torsemide 10 MG Tab  Commonly known as: DEMADEX  Take 1 tablet (10 mg " total) by mouth every 48 hours as needed (leg swelling).     VITAMIN D3 25 mcg (1,000 unit) capsule  Generic drug: cholecalciferol (vitamin D3)  Take 1 capsule (1,000 Units total) by mouth once daily.           * This list has 2 medication(s) that are the same as other medications prescribed for you. Read the directions carefully, and ask your doctor or other care provider to review them with you.                  Indwelling Lines/Drains at time of discharge:   Lines/Drains/Airways       Drain  Duration             Female External Urinary Catheter w/ Suction 06/05/24 1520 2 days                    Time spent on the discharge of patient: 50 minutes         Kym eDnt MD  Department of Hospital Medicine  [facility] Surg

## 2024-11-19 NOTE — PROGRESS NOTE ADULT - PROBLEM SELECTOR PROBLEM 3
----- Message from Libra sent at 11/19/2024  9:04 AM CST -----  Regarding: meds  Name of Who is Calling:Pt         What is the request in detail: Requesting if provider can fill stronger script for pain   Please advise           Can the clinic reply by [facility]: no         What Number to Call Back [facility] not [facility]:Telephone Information:  Pharmly          [phone]   HTN (hypertension)

## 2025-03-18 NOTE — ED PROVIDER NOTE - TOBACCO USE
1. Chronic sinusitis, unspecified location  -     triamcinolone (NASACORT) 55 MCG/ACT nasal inhaler; 2 sprays by Each Nostril route daily, Disp-1 each, R-3Normal  -     azelastine (ASTELIN) 0.1 % nasal spray; 2 sprays by Nasal route 2 times daily Use in each nostril as directed, Disp-120 mL, [facility]  2. Allergic rhinitis, unspecified seasonality, unspecified trigger  -     triamcinolone (NASACORT) 55 MCG/ACT nasal inhaler; 2 sprays by Each Nostril route daily, Disp-1 each, R-3Normal  -     azelastine (ASTELIN) 0.1 % nasal spray; 2 sprays by Nasal route 2 times daily Use in each nostril as directed, Disp-120 mL, [facility]  3. Dry eyes  -     triamcinolone (NASACORT) 55 MCG/ACT nasal inhaler; 2 sprays by Each Nostril route daily, Disp-1 each, R-3Normal  -     azelastine (ASTELIN) 0.1 % nasal spray; 2 sprays by Nasal route 2 times daily Use in each nostril as directed, Disp-120 mL, [address]      
Unknown if ever smoked

## 2025-05-21 NOTE — ED PROVIDER NOTE - DISCHARGE DATE
New order for cardio has been entered. Message sent to patient about this being done but since he is established with cardio I'd recommend requesting a new physician. Unless he wishes to go outside the network.    04-May-2019
